# Patient Record
Sex: MALE | Race: WHITE | NOT HISPANIC OR LATINO | Employment: FULL TIME | URBAN - METROPOLITAN AREA
[De-identification: names, ages, dates, MRNs, and addresses within clinical notes are randomized per-mention and may not be internally consistent; named-entity substitution may affect disease eponyms.]

---

## 2017-10-27 ENCOUNTER — OFFICE VISIT (OUTPATIENT)
Dept: PEDIATRICS | Facility: CLINIC | Age: 27
End: 2017-10-27
Payer: COMMERCIAL

## 2017-10-27 VITALS
TEMPERATURE: 98.5 F | RESPIRATION RATE: 16 BRPM | HEART RATE: 76 BPM | DIASTOLIC BLOOD PRESSURE: 82 MMHG | HEIGHT: 69 IN | SYSTOLIC BLOOD PRESSURE: 128 MMHG | WEIGHT: 217.5 LBS | BODY MASS INDEX: 32.22 KG/M2

## 2017-10-27 DIAGNOSIS — R10.30 INGUINAL PAIN, UNSPECIFIED LATERALITY: Primary | ICD-10-CM

## 2017-10-27 DIAGNOSIS — N50.819 TESTICULAR DISCOMFORT: ICD-10-CM

## 2017-10-27 PROCEDURE — 99213 OFFICE O/P EST LOW 20 MIN: CPT | Performed by: INTERNAL MEDICINE

## 2017-10-27 NOTE — MR AVS SNAPSHOT
After Visit Summary   10/27/2017    Cristobal Rodriguez Jr.    MRN: [id number]           Patient Information     Date Of Birth          1990        Visit Information        Provider Department      10/27/2017 3:00 PM Raymundo Stahl MD [facility]        Today's Diagnoses     Inguinal pain, unspecified laterality    -  1    Testicular discomfort          Care Instructions      Testicular Pain, Unclear Cause  You have had pain in one or both testicles. Based on your exam today, the exact cause of your pain is not certain. But your condition does not appear to be dangerous. Testicles are very sensitive. Even a small injury can cause quite a bit of pain. Other possible causes of testicular pain include kidney stones, cysts, mumps, inflammatory conditions, chronic conditions, hernia, infection, and a twisted testicle.  Certain tests may be done to rule out an underlying problem causing the pain. Nothing conclusive was found today. Most likely, the pain will go away on its own. If it doesn t, you may need more tests.    Home care  Medicine may be prescribed to help relieve pain and swelling. This may be an over-the-counter pain reliever or prescription pain medication. Take all medicine as directed.  The following are general care guidelines:    To relieve pain and swelling, apply an ice pack wrapped in a thin towel for 10 minutes at a time. Continue this on and off for 1 to 2 days.    When lying down, place a small rolled towel under your scrotum. When moving around, wear a jockstrap (athletic supporter) or supportive underwear. These will help support and protect your testicles.    If it hurts to walk, walk as little as possible until you feel better.    Avoid strenuous activity until you feel better.    Do not have sex until you feel better.    If you have severe pain in the testicle, seek care right away. Delay may lead to permanent loss of the testicle s function.  Follow-up care  Follow  up with your healthcare provider, or as advised.  When to seek medical advice  Call your healthcare provider right away if any of these occur:    Fever of 100.4 F (38 C) or higher    Worsening of the pain or severe pain    Swelling of the testicle or scrotum    A lump in the scrotum    Warm and red scrotum (signs of infection)    Nausea and vomiting    Pain or swelling in abdomen    Trouble urinating    Numbness or weakness in the leg    Shrinking of the testicle    Blood in your urine  Date Last Reviewed: 10/1/2016    1402-9516 [employer]. [address] [place], [place] [address]. All rights reserved. This information is not intended as a substitute for professional medical care. Always follow your healthcare professional's instructions.                Follow-ups after your visit        Future tests that were ordered for you today     Open Future Orders        Priority Expected Expires Ordered    US Testicular and Scrotum Routine  10/27/2018 10/27/2017            Who to contact     If you have questions or need follow up information about today's clinic visit or your schedule please contact [facility] directly at [phone].  Normal or non-critical lab and imaging results will be communicated to you by DrEd Online Doctorhart, letter or phone within 4 business days after the clinic has received the results. If you do not hear from us within 7 days, please contact the clinic through JK-Groupt or phone. If you have a critical or abnormal lab result, we will notify you by phone as soon as possible.  Submit refill requests through Employma or call your pharmacy and they will forward the refill request to us. Please allow 3 business days for your refill to be completed.          Additional Information About Your Visit        Employma Information     Employ[employer] gives you secure access to your electronic health record. If you see a primary care provider, you can also send messages to your care team and make  "appointments. If you have questions, please call your primary care clinic.  If you do not have a primary care provider, please call [phone] and they will assist you.        Care EveryWhere ID     This is your Care EveryWhere ID. This could be used by other organizations to access your [place] medical records  [id number]        Your Vitals Were     Pulse Temperature Respirations Height BMI (Body Mass Index)       76 98.5  F (36.9  C) (Oral) 16 5' 8.75\" (1.746 m) 32.35 kg/m2        Blood Pressure from Last 3 Encounters:   10/27/17 128/82   07/29/16 120/70   09/11/14 124/76    Weight from Last 3 Encounters:   10/27/17 217 lb 8 oz (98.7 kg)   07/29/16 189 lb 11.2 oz (86 kg)   09/11/14 184 lb 14.4 oz (83.9 kg)                 Today's Medication Changes          These changes are accurate as of: 10/27/17  3:45 PM.  If you have any questions, ask your nurse or doctor.               Stop taking these medicines if you haven't already. Please contact your care team if you have questions.     diltiazem 2% in PLO cream (FV COMPOUNDED) 2% Gel   Stopped by:  Raymundo Stahl MD                    Primary Care Provider Office Phone # Fax #    Raymundo Stahl [phone] [address] [place] [address]        Equal Access to Services     [facility]: Hadii daquan vee hadasho Sodimas, waaxda luqadaha, qaybta kaalmaamanda oreilly. So [facility] [phone].    ATENCIÓN: Si habla español, tiene a bowman disposición servicios gratuitos de asistencia lingüística. Llame al [phone].    We comply with applicable federal civil rights laws and [place] laws. We do not discriminate on the basis of race, color, national origin, age, disability, sex, sexual orientation, or gender identity.            Thank you!     Thank you for choosing [facility]  for your care. Our goal is always to provide you with excellent care. Hearing back from our patients " is one way we can continue to improve our services. Please take a few minutes to complete the written survey that you may receive in the mail after your visit with us. Thank you!             Your Updated Medication List - Protect others around you: Learn how to safely use, store and throw away your medicines at www.disposemymeds.org.      Notice  As of 10/27/2017  3:45 PM    You have not been prescribed any medications.

## 2017-10-27 NOTE — NURSING NOTE
"Chief Complaint   Patient presents with     Groin Pain       Initial /82  Pulse 76  Temp 98.5  F (36.9  C) (Oral)  Resp 16  Ht 5' 8.75\" (1.746 m)  Wt 217 lb 8 oz (98.7 kg)  BMI 32.35 kg/m2 Estimated body mass index is 32.35 kg/(m^2) as calculated from the following:    Height as of this encounter: 5' 8.75\" (1.746 m).    Weight as of this encounter: 217 lb 8 oz (98.7 kg).  Medication Reconciliation: complete   Katelyn COCHRAN, CMA,AAMA      "

## 2017-10-27 NOTE — PROGRESS NOTES
"SUBJECTIVE:                                                    Cristobal Rodriguez Jr. is a 27 year old male who presents to clinic today for the following health issues:    Groin pan      Duration: 3-4 months    Description (location/character/radiation): Groin pain area    Intensity:  moderate    Accompanying signs and symptoms: abdominal pain. Pt not sure if its hernia related    History (similar episodes/previous evaluation):     Precipitating or alleviating factors: None    Therapies tried and outcome: None       patient here for above, notes some groin pain/discomfort. no injury. symptoms are not always there, seems to occur about every other week. nothing mkes it better. has been workign out more and can \"feel\" it there. hasn't felt a bulge per se. no history of hernias or any abdominal surgery in the past. no fevers, no other symtoms that he has . appetite is good. feels fine otherwise. patient usually doesn' wear underwear, but has bene wearing tighter athletic type underwear and this does seem to help. patient got a flu shot a few weeks ago. No other concerns or complaints today.     Problem list and histories reviewed & adjusted, as indicated.  Additional history: as documented  Patient Active Problem List    Diagnosis Date Noted     Anal fissure 06/06/2014     Priority: Medium     CARDIOVASCULAR SCREENING; LDL GOAL LESS THAN 160 12/06/2011     Priority: Medium     Social History   Substance Use Topics     Smoking status: Never Smoker     Smokeless tobacco: Never Used     Alcohol use Yes      Comment: once weekly     Family History   Problem Relation Age of Onset     Hypertension Father      Myocardial Infarction Paternal Grandfather      Hypertension Sister        ROS:  A complete 10 point review of systems was taken and negative except for those noted in the subjective/HPI section(s) above     BP Readings from Last 3 Encounters:   10/27/17 128/82   07/29/16 120/70   09/11/14 124/76    Wt " "Readings from Last 3 Encounters:   10/27/17 217 lb 8 oz (98.7 kg)   07/29/16 189 lb 11.2 oz (86 kg)   09/11/14 184 lb 14.4 oz (83.9 kg)                      OBJECTIVE:                                                    /82  Pulse 76  Temp 98.5  F (36.9  C) (Oral)  Resp 16  Ht 5' 8.75\" (1.746 m)  Wt 217 lb 8 oz (98.7 kg)  BMI 32.35 kg/m2   Constitutional: healthy, alert and no distress  HEENT: normocephalic and atrumatic, mucous membranes moist, op clear, mucous membranes moist, neck supple   Cardiovascular: regular rate and rhythm no rubs, gallops or murmurs normal S1/S2; no S3 or S4  Respiratory: clear to auscultation bilaterally in all lung fields, normal insp/exp effort. Good air movement  Gastrointestinal: Abdomen soft, non-tender. BS normal. No masses, organomegaly  :  penis, scrotum, testes normal, no hernias, epididymal tenderness present on the R, ? varicocele, no mass apprecited, hernia not appreciated  Back:  CVA Tenderness Present No   Musculoskeletal: extremities normal- no gross deformities noted, gait normal and normal muscle tone  Skin: no suspicious lesions or rashes  Psychiatric: mentation appears normal and affect normal/bright           ASSESSMENT/PLAN:                                                        ICD-10-CM    1. Inguinal pain, unspecified laterality R10.30 US Testicular and Scrotum   2. Testicular discomfort N50.819 US Testicular and Scrotum   discussed with patient (or patient's parents/caregiver) pathophysiology of condition and treatment options.  physical exam shows possible varicocele, given history will get scrotal US for further evaluation. reviwed signs/symtpsom fo hernia and other cuases of male  pain, Also d/w pt signs/symptoms of worsening of condition and need for urgent ED evaluation. Patient verbalized understanding and is agreeable to this plan.  Patient verbalized understanding and is agreeable to this plan.     Estimated body mass index is 28.01 kg/(m^2) " "as calculated from the following:    Height as of 6/6/14: 5' 9\" (1.753 m).    Weight as of 7/29/16: 189 lb 11.2 oz (86 kg).      Return to clinic as needed or if symptoms persist, change, worsen or if any new symptoms develop.      Raymundo Stahl M.D.  Internal Medicine-Pediatrics            "

## 2017-10-27 NOTE — PATIENT INSTRUCTIONS
Testicular Pain, Unclear Cause  You have had pain in one or both testicles. Based on your exam today, the exact cause of your pain is not certain. But your condition does not appear to be dangerous. Testicles are very sensitive. Even a small injury can cause quite a bit of pain. Other possible causes of testicular pain include kidney stones, cysts, mumps, inflammatory conditions, chronic conditions, hernia, infection, and a twisted testicle.  Certain tests may be done to rule out an underlying problem causing the pain. Nothing conclusive was found today. Most likely, the pain will go away on its own. If it doesn t, you may need more tests.    Home care  Medicine may be prescribed to help relieve pain and swelling. This may be an over-the-counter pain reliever or prescription pain medication. Take all medicine as directed.  The following are general care guidelines:    To relieve pain and swelling, apply an ice pack wrapped in a thin towel for 10 minutes at a time. Continue this on and off for 1 to 2 days.    When lying down, place a small rolled towel under your scrotum. When moving around, wear a jockstrap (athletic supporter) or supportive underwear. These will help support and protect your testicles.    If it hurts to walk, walk as little as possible until you feel better.    Avoid strenuous activity until you feel better.    Do not have sex until you feel better.    If you have severe pain in the testicle, seek care right away. Delay may lead to permanent loss of the testicle s function.  Follow-up care  Follow up with your healthcare provider, or as advised.  When to seek medical advice  Call your healthcare provider right away if any of these occur:    Fever of 100.4 F (38 C) or higher    Worsening of the pain or severe pain    Swelling of the testicle or scrotum    A lump in the scrotum    Warm and red scrotum (signs of infection)    Nausea and vomiting    Pain or swelling in abdomen    Trouble  urinating    Numbness or weakness in the leg    Shrinking of the testicle    Blood in your urine  Date Last Reviewed: 10/1/2016    2470-4380 [employer]. [address] [place] [address], [place] [address]. All rights reserved. This information is not intended as a substitute for professional medical care. Always follow your healthcare professional's instructions.

## 2017-11-06 ENCOUNTER — HOSPITAL ENCOUNTER (OUTPATIENT)
Dept: ULTRASOUND IMAGING | Facility: CLINIC | Age: 27
Discharge: HOME | End: 2017-11-06
Attending: INTERNAL MEDICINE | Admitting: INTERNAL MEDICINE
Payer: COMMERCIAL

## 2017-11-06 DIAGNOSIS — R10.30 INGUINAL PAIN, UNSPECIFIED LATERALITY: ICD-10-CM

## 2017-11-06 DIAGNOSIS — N50.819 TESTICULAR DISCOMFORT: ICD-10-CM

## 2017-11-06 PROCEDURE — 93976 VASCULAR STUDY: CPT

## 2018-10-15 ENCOUNTER — OFFICE VISIT (OUTPATIENT)
Dept: PEDIATRICS | Facility: CLINIC | Age: 28
End: 2018-10-15
Payer: COMMERCIAL

## 2018-10-15 VITALS
SYSTOLIC BLOOD PRESSURE: 122 MMHG | OXYGEN SATURATION: 96 % | DIASTOLIC BLOOD PRESSURE: 80 MMHG | TEMPERATURE: 97.8 F | WEIGHT: 207 LBS | BODY MASS INDEX: 30.66 KG/M2 | HEART RATE: 75 BPM | HEIGHT: 69 IN

## 2018-10-15 DIAGNOSIS — R03.0 ELEVATED BP WITHOUT DIAGNOSIS OF HYPERTENSION: Primary | ICD-10-CM

## 2018-10-15 DIAGNOSIS — Z23 NEED FOR PROPHYLACTIC VACCINATION AND INOCULATION AGAINST INFLUENZA: ICD-10-CM

## 2018-10-15 PROCEDURE — 90686 IIV4 VACC NO PRSV 0.5 ML IM: CPT | Performed by: INTERNAL MEDICINE

## 2018-10-15 PROCEDURE — 99213 OFFICE O/P EST LOW 20 MIN: CPT | Mod: 25 | Performed by: INTERNAL MEDICINE

## 2018-10-15 PROCEDURE — 90471 IMMUNIZATION ADMIN: CPT | Performed by: INTERNAL MEDICINE

## 2018-10-15 RX ORDER — MULTIPLE VITAMINS W/ MINERALS TAB 9MG-400MCG
1 TAB ORAL DAILY
COMMUNITY

## 2018-10-15 NOTE — MR AVS SNAPSHOT
After Visit Summary   10/15/2018    Cristobal Rodriguez Jr.    MRN: [id number]           Patient Information     Date Of Birth          1990        Visit Information        Provider Department      10/15/2018 7:30 AM Michele Laird MD [facility]        Today's Diagnoses     Elevated BP without diagnosis of hypertension    -  1    Need for prophylactic vaccination and inoculation against influenza          Care Instructions    Nice to meet you today!    Blood pressure looks great today - my guess is that you were either a bit nervous for the screening or their BP cuff was off.     You are free to stop by our Pharmacy anytime to have your BP checked - they will let us know if it is high and we will be in touch.     Come back to see Dr. Stahl or myself for a Wellness/Physical in the next month or two and we'll do a head to toe exam.     Bring a copy of the bloodwork from your check at work.           Follow-ups after your visit        Follow-up notes from your care team     Return in about 4 weeks (around 11/12/2018) for Wellness Visit.      Your next 10 appointments already scheduled     Nov 02, 2018  7:10 AM CDT   PHYSICAL with Michele Laird MD   [facility]    [address] [place] [id number] [phone]              Who to contact     If you have questions or need follow up information about today's clinic visit or your schedule please contact [facility] directly at [phone].  Normal or non-critical lab and imaging results will be communicated to you by MyChart, letter or phone within 4 business days after the clinic has received the results. If you do not hear from us within 7 days, please contact the clinic through MyChart or phone. If you have a critical or abnormal lab result, we will notify you by phone as soon as possible.  Submit refill requests through [employer] Mediahart or call your  "pharmacy and they will forward the refill request to us. Please allow 3 business days for your refill to be completed.          Additional Information About Your Visit        Putneyhart Information     [employer] gives you secure access to your electronic health record. If you see a primary care provider, you can also send messages to your care team and make appointments. If you have questions, please call your primary care clinic.  If you do not have a primary care provider, please call [phone] and they will assist you.        Care EveryWhere ID     This is your Care EveryWhere ID. This could be used by other organizations to access your [facility] medical records  [id number]        Your Vitals Were     Pulse Temperature Height Pulse Oximetry BMI (Body Mass Index)       75 97.8  F (36.6  C) (Oral) 5' 8.75\" (1.746 m) 96% 30.79 kg/m2        Blood Pressure from Last 3 Encounters:   10/15/18 122/80   10/27/17 128/82   07/29/16 120/70    Weight from Last 3 Encounters:   10/15/18 207 lb (93.9 kg)   10/27/17 217 lb 8 oz (98.7 kg)   07/29/16 189 lb 11.2 oz (86 kg)              We Performed the Following     FLU VACCINE, SPLIT VIRUS, IM (QUADRIVALENT) [42132]- >3 YRS     Vaccine Administration, Initial [00087]        Primary Care Provider Office Phone # Fax #    Raymundo Stahl [phone] [address] [place] [address]        Equal Access to Services     [facility]: Hadii aad ku hadasho Soomaali, waaxda luqadaha, qaybta kaalmada adeegyada, amanda sewell . So [facility] [phone].    ATENCIÓN: Si habla español, tiene a bowman disposición servicios gratuitos de asistencia lingüística. Llame al [phone].    We comply with applicable federal civil rights laws and [place] laws. We do not discriminate on the basis of race, color, national origin, age, disability, sex, sexual orientation, or gender identity.            Thank you!     Thank you for choosing " [facility]  for your care. Our goal is always to provide you with excellent care. Hearing back from our patients is one way we can continue to improve our services. Please take a few minutes to complete the written survey that you may receive in the mail after your visit with us. Thank you!             Your Updated Medication List - Protect others around you: Learn how to safely use, store and throw away your medicines at www.disposemymeds.org.          This list is accurate as of 10/15/18  8:12 AM.  Always use your most recent med list.                   Brand Name Dispense Instructions for use Diagnosis    Multi-vitamin Tabs tablet      Take 1 tablet by mouth daily

## 2018-10-15 NOTE — PROGRESS NOTES
"  SUBJECTIVE:   Cristobal Rodriguez Jr. is a 28 year old male who presents to clinic today for the following health issues:    Patient had a wellness visit at work a few weeks back and they flagged him on blood pressure and to him he should get evaluated by it. Patient has not been checking blood pressures. Reports no chest pain.   -------------    Sister with htn at age 20. Otherwise fh in older relatives.     Reviewed BPs have been normal in clinic the past few years.     Has some intermittent nausea when working out but otherwise no cp or sob. Trying to \"hit it hard\" and may not eat enough before. No leg swelling, urinary symptoms.     Problem list and histories reviewed & adjusted, as indicated.  Additional history: as documented    Patient Active Problem List   Diagnosis     CARDIOVASCULAR SCREENING; LDL GOAL LESS THAN 160     Anal fissure     Past Surgical History:   Procedure Laterality Date     ARTHROTOMY SHOULDER, ROTATOR CUFF REPAIR, COMBINED  4/26/2013    Procedure: COMBINED ARTHROTOMY SHOULDER, ROTATOR CUFF REPAIR;  Left Open Shoulder Capsular Shift and Labral Repair;  Surgeon: Ras Caraballo MD;  Location: [facility] OR     FRACTURE TX, FINGER/HAND      No surgery just casting.     LASIK      bilateral       Social History   Substance Use Topics     Smoking status: Never Smoker     Smokeless tobacco: Never Used     Alcohol use Yes      Comment: once weekly     Family History   Problem Relation Age of Onset     Hypertension Father      Depression Mother      Myocardial Infarction Paternal Grandfather      In his 50s     Hypertension Sister      In her mid-20s     Depression Maternal Grandmother      Depression Sister      Depression Brother      Colon Cancer No family hx of      Prostate Cancer No family hx of          Current Outpatient Prescriptions   Medication Sig Dispense Refill     multivitamin, therapeutic with minerals (MULTI-VITAMIN) TABS tablet Take 1 tablet by mouth daily       Allergies   Allergen " "Reactions     Amoxicillin Rash       Reviewed and updated as needed this visit by clinical staff       Reviewed and updated as needed this visit by Provider         ROS:  Constitutional, HEENT, cardiovascular, pulmonary, gi and gu systems are negative, except as otherwise noted.    OBJECTIVE:     /80  Pulse 75  Temp 97.8  F (36.6  C) (Oral)  Ht 5' 8.75\" (1.746 m)  Wt 207 lb (93.9 kg)  SpO2 96%  BMI 30.79 kg/m2  Body mass index is 30.79 kg/(m^2).  GENERAL: healthy, alert and no distress  RESP: lungs clear to auscultation - no rales, rhonchi or wheezes  CV: regular rate and rhythm, normal S1 S2, no S3 or S4, no murmur, click or rub, no peripheral edema and peripheral pulses strong  MS: no gross musculoskeletal defects noted, no edema  SKIN: no suspicious lesions or rashes    Diagnostic Test Results:  none     ASSESSMENT/PLAN:       ICD-10-CM    1. Elevated BP without diagnosis of hypertension R03.0    2. Need for prophylactic vaccination and inoculation against influenza Z23 Vaccine Administration, Initial [06496]     FLU VACCINE, SPLIT VIRUS, IM (QUADRIVALENT) [21379]- >3 YRS     Patient Instructions   Nice to meet you today!    Blood pressure looks great today - my guess is that you were either a bit nervous for the screening or their BP cuff was off.     You are free to stop by our Pharmacy anytime to have your BP checked - they will let us know if it is high and we will be in touch.     Come back to see Dr. Stahl or myself for a Wellness/Physical in the next month or two and we'll do a head to toe exam.     Bring a copy of the bloodwork from your check at work.     Michele Laird MD  [facility]    Injectable Influenza Immunization Documentation    1.  Is the person to be vaccinated sick today?   No    2. Does the person to be vaccinated have an allergy to a component   of the vaccine?   No  Egg Allergy Algorithm Link    3. Has the person to be vaccinated ever had a serious " reaction   to influenza vaccine in the past?   No    4. Has the person to be vaccinated ever had Guillain-Barré syndrome?   No    Form completed by Flakita Eli CMA

## 2018-10-15 NOTE — PATIENT INSTRUCTIONS
Nice to meet you today!    Blood pressure looks great today - my guess is that you were either a bit nervous for the screening or their BP cuff was off.     You are free to stop by our Pharmacy anytime to have your BP checked - they will let us know if it is high and we will be in touch.     Come back to see Dr. Stahl or myself for a Wellness/Physical in the next month or two and we'll do a head to toe exam.     Bring a copy of the bloodwork from your check at work.

## 2018-11-02 ENCOUNTER — OFFICE VISIT (OUTPATIENT)
Dept: PEDIATRICS | Facility: CLINIC | Age: 28
End: 2018-11-02
Payer: COMMERCIAL

## 2018-11-02 VITALS
TEMPERATURE: 98.3 F | OXYGEN SATURATION: 96 % | DIASTOLIC BLOOD PRESSURE: 72 MMHG | HEART RATE: 76 BPM | HEIGHT: 69 IN | SYSTOLIC BLOOD PRESSURE: 118 MMHG | WEIGHT: 207 LBS | BODY MASS INDEX: 30.66 KG/M2

## 2018-11-02 DIAGNOSIS — E66.09 CLASS 1 OBESITY DUE TO EXCESS CALORIES WITHOUT SERIOUS COMORBIDITY WITH BODY MASS INDEX (BMI) OF 30.0 TO 30.9 IN ADULT: ICD-10-CM

## 2018-11-02 DIAGNOSIS — E66.811 CLASS 1 OBESITY DUE TO EXCESS CALORIES WITHOUT SERIOUS COMORBIDITY WITH BODY MASS INDEX (BMI) OF 30.0 TO 30.9 IN ADULT: ICD-10-CM

## 2018-11-02 DIAGNOSIS — Z00.00 ROUTINE GENERAL MEDICAL EXAMINATION AT A HEALTH CARE FACILITY: Primary | ICD-10-CM

## 2018-11-02 PROCEDURE — 99395 PREV VISIT EST AGE 18-39: CPT | Performed by: INTERNAL MEDICINE

## 2018-11-02 ASSESSMENT — ENCOUNTER SYMPTOMS
SORE THROAT: 0
ARTHRALGIAS: 0
HEMATURIA: 0
CHILLS: 0
NERVOUS/ANXIOUS: 0
DIARRHEA: 0
PALPITATIONS: 0
HEADACHES: 0
FEVER: 0
EYE PAIN: 0
JOINT SWELLING: 0
ABDOMINAL PAIN: 0
PARESTHESIAS: 0
COUGH: 0
HEMATOCHEZIA: 0
NAUSEA: 0
SHORTNESS OF BREATH: 0
DIZZINESS: 0
DYSURIA: 0
MYALGIAS: 0
WEAKNESS: 0
HEARTBURN: 0
FREQUENCY: 0
CONSTIPATION: 0

## 2018-11-02 NOTE — PATIENT INSTRUCTIONS
Nice to see you again today!    Keep working on the working out/diet.     Preventive Health Recommendations  Male Ages 26 - 39    Yearly exam:             See your health care provider every year in order to  o   Review health changes.   o   Discuss preventive care.    o   Review your medicines if your doctor has prescribed any.    You should be tested each year for STDs (sexually transmitted diseases), if you re at risk.     After age 35, talk to your provider about cholesterol testing. If you are at risk for heart disease, have your cholesterol tested at least every 5 years.     If you are at risk for diabetes, you should have a diabetes test (fasting glucose).  Shots: Get a flu shot each year. Get a tetanus shot every 10 years.     Nutrition:    Eat at least 5 servings of fruits and vegetables daily.     Eat whole-grain bread, whole-wheat pasta and brown rice instead of white grains and rice.     Get adequate Calcium and Vitamin D.     Lifestyle    Exercise for at least 150 minutes a week (30 minutes a day, 5 days a week). This will help you control your weight and prevent disease.     Limit alcohol to one drink per day.     No smoking.     Wear sunscreen to prevent skin cancer.     See your dentist every six months for an exam and cleaning.

## 2018-11-02 NOTE — PROGRESS NOTES
SUBJECTIVE:   CC: Cristobal Rodriguez Jr. is an 28 year old male who presents for preventative health visit.     Physical   Annual:     Getting at least 3 servings of Calcium per day:  Yes    Bi-annual eye exam:  Yes    Dental care twice a year:  NO    Sleep apnea or symptoms of sleep apnea:  None    Diet:  Regular (no restrictions)    Frequency of exercise:  2-3 days/week    Duration of exercise:  30-45 minutes    Taking medications regularly:  Not Applicable    Additional concerns today:  No    CONCERN: None    Today's PHQ-2 Score:   PHQ-2 ( 1999 [employer]) 11/2/2018   Q1: Little interest or pleasure in doing things 0   Q2: Feeling down, depressed or hopeless 0   PHQ-2 Score 0   Q1: Little interest or pleasure in doing things Not at all   Q2: Feeling down, depressed or hopeless Not at all   PHQ-2 Score 0     Abuse: Current or Past(Physical, Sexual or Emotional)- No  Do you feel safe in your environment - Yes    Social History   Substance Use Topics     Smoking status: Never Smoker     Smokeless tobacco: Never Used     Alcohol use Yes      Comment: once weekly     Alcohol Use 11/2/2018   If you drink alcohol do you typically have greater than 3 drinks per day OR greater than 7 drinks per week? No   No flowsheet data found.    Last PSA: No results found for: PSA    Reviewed orders with patient. Reviewed health maintenance and updated orders accordingly - Yes  Patient Active Problem List   Diagnosis     CARDIOVASCULAR SCREENING; LDL GOAL LESS THAN 160     Anal fissure     Past Surgical History:   Procedure Laterality Date     ARTHROTOMY SHOULDER, ROTATOR CUFF REPAIR, COMBINED  4/26/2013    Procedure: COMBINED ARTHROTOMY SHOULDER, ROTATOR CUFF REPAIR;  Left Open Shoulder Capsular Shift and Labral Repair;  Surgeon: Ras Caraballo MD;  Location: [facility] OR     FRACTURE TX, FINGER/HAND      No surgery just casting.     LASIK      bilateral       Social History   Substance Use Topics     Smoking status: Never Smoker     Smokeless  tobacco: Never Used     Alcohol use Yes      Comment: once weekly     Family History   Problem Relation Age of Onset     Hypertension Father      Depression Mother      Myocardial Infarction Paternal Grandfather      In his 50s     Hypertension Sister      In her mid-20s     Depression Maternal Grandmother      Depression Sister      Depression Brother      Colon Cancer No family hx of      Prostate Cancer No family hx of          Current Outpatient Prescriptions   Medication Sig Dispense Refill     multivitamin, therapeutic with minerals (MULTI-VITAMIN) TABS tablet Take 1 tablet by mouth daily       Allergies   Allergen Reactions     Amoxicillin Rash       Reviewed and updated as needed this visit by clinical staff  Tobacco  Allergies  Meds  Surg Hx         Reviewed and updated as needed this visit by Provider  Surg Hx        Past Medical History:   Diagnosis Date     NO ACTIVE PROBLEMS       Past Surgical History:   Procedure Laterality Date     ARTHROTOMY SHOULDER, ROTATOR CUFF REPAIR, COMBINED  4/26/2013    Procedure: COMBINED ARTHROTOMY SHOULDER, ROTATOR CUFF REPAIR;  Left Open Shoulder Capsular Shift and Labral Repair;  Surgeon: Ras Caraballo MD;  Location: [facility] OR     FRACTURE TX, FINGER/HAND      No surgery just casting.     LASIK      bilateral       Review of Systems   Constitutional: Negative for chills and fever.   HENT: Negative for congestion, ear pain, hearing loss and sore throat.    Eyes: Negative for pain and visual disturbance.   Respiratory: Negative for cough and shortness of breath.    Cardiovascular: Negative for chest pain, palpitations and peripheral edema.   Gastrointestinal: Negative for abdominal pain, constipation, diarrhea, heartburn, hematochezia and nausea.   Genitourinary: Negative for discharge, dysuria, frequency, genital sores, hematuria, impotence and urgency.   Musculoskeletal: Negative for arthralgias, joint swelling and myalgias.   Skin: Negative for rash.  "  Neurological: Negative for dizziness, weakness, headaches and paresthesias.   Psychiatric/Behavioral: Negative for mood changes. The patient is not nervous/anxious.      OBJECTIVE:   /72  Pulse 76  Temp 98.3  F (36.8  C) (Oral)  Ht 5' 8.75\" (1.746 m)  Wt 207 lb (93.9 kg)  SpO2 96%  BMI 30.79 kg/m2    Physical Exam  GENERAL: healthy, alert and no distress  EYES: Eyes grossly normal to inspection, PERRL and conjunctivae and sclerae normal  HENT: ear canals and TM's normal, nose and mouth without ulcers or lesions  NECK: no adenopathy, no asymmetry, masses, or scars and thyroid normal to palpation  RESP: lungs clear to auscultation - no rales, rhonchi or wheezes  CV: regular rate and rhythm, normal S1 S2, no S3 or S4, no murmur, click or rub, no peripheral edema and peripheral pulses strong  ABDOMEN: soft, nontender, no hepatosplenomegaly, no masses and bowel sounds normal  MS: no gross musculoskeletal defects noted, no edema  SKIN: no suspicious lesions or rashes  NEURO: Normal strength and tone, mentation intact and speech normal  PSYCH: mentation appears normal, affect normal/bright    Diagnostic Test Results:  none     ASSESSMENT/PLAN:       ICD-10-CM    1. Routine general medical examination at a health care facility Z00.00    2. Class 1 obesity due to excess calories without serious comorbidity with body mass index (BMI) of 30.0 to 30.9 in adult E66.09     Z68.30      Overall doing well.     Muscular but does carry some weight in abdomen - working on diet/exercise. He would like to lose 10-20 lbs.     F/u in 1 year or sooner if needed.     COUNSELING:   Reviewed preventive health counseling, as reflected in patient instructions       Regular exercise       Healthy diet/nutrition       Family planning       Declines STI screening today    BP Readings from Last 1 Encounters:   11/02/18 118/72     Estimated body mass index is 30.79 kg/(m^2) as calculated from the following:    Height as of this " "encounter: 5' 8.75\" (1.746 m).    Weight as of this encounter: 207 lb (93.9 kg).    Weight management plan: Discussed healthy diet and exercise guidelines and patient will follow up in 12 months in clinic to re-evaluate.     reports that he has never smoked. He has never used smokeless tobacco.    Counseling Resources:  ATP IV Guidelines  Pooled Cohorts Equation Calculator  FRAX Risk Assessment  ICSI Preventive Guidelines  Dietary Guidelines for Americans, 2010  USDA's MyPlate  ASA Prophylaxis  Lung CA Screening    Michele Laird MD  [facility]  "

## 2018-11-02 NOTE — MR AVS SNAPSHOT
After Visit Summary   11/2/2018    Cristobal Rodriguez Jr.    MRN: [id number]           Patient Information     Date Of Birth          1990        Visit Information        Provider Department      11/2/2018 7:10 AM Michele Laird MD [facility]        Today's Diagnoses     Routine general medical examination at a health care facility    -  1      Care Instructions    Nice to see you again today!    Keep working on the working out/diet.     Preventive Health Recommendations  Male Ages 26 - 39    Yearly exam:             See your health care provider every year in order to  o   Review health changes.   o   Discuss preventive care.    o   Review your medicines if your doctor has prescribed any.    You should be tested each year for STDs (sexually transmitted diseases), if you re at risk.     After age 35, talk to your provider about cholesterol testing. If you are at risk for heart disease, have your cholesterol tested at least every 5 years.     If you are at risk for diabetes, you should have a diabetes test (fasting glucose).  Shots: Get a flu shot each year. Get a tetanus shot every 10 years.     Nutrition:    Eat at least 5 servings of fruits and vegetables daily.     Eat whole-grain bread, whole-wheat pasta and brown rice instead of white grains and rice.     Get adequate Calcium and Vitamin D.     Lifestyle    Exercise for at least 150 minutes a week (30 minutes a day, 5 days a week). This will help you control your weight and prevent disease.     Limit alcohol to one drink per day.     No smoking.     Wear sunscreen to prevent skin cancer.     See your dentist every six months for an exam and cleaning.             Follow-ups after your visit        Follow-up notes from your care team     Return in about 1 year (around 11/2/2019) for Wellness Visit.      Who to contact     If you have questions or need follow up information about today's clinic visit or your schedule please  "contact [facility] directly at [phone].  Normal or non-critical lab and imaging results will be communicated to you by MyChart, letter or phone within 4 business days after the clinic has received the results. If you do not hear from us within 7 days, please contact the clinic through MyChart or phone. If you have a critical or abnormal lab result, we will notify you by phone as soon as possible.  Submit refill requests through [employer] or call your pharmacy and they will forward the refill request to us. Please allow 3 business days for your refill to be completed.          Additional Information About Your Visit        [email or web address] Information     4[employer] gives you secure access to your electronic health record. If you see a primary care provider, you can also send messages to your care team and make appointments. If you have questions, please call your primary care clinic.  If you do not have a primary care provider, please call [phone] and they will assist you.        Care EveryWhere ID     This is your Care EveryWhere ID. This could be used by other organizations to access your [place] medical records  [id number]        Your Vitals Were     Pulse Temperature Height Pulse Oximetry BMI (Body Mass Index)       76 98.3  F (36.8  C) (Oral) 5' 8.75\" (1.746 m) 96% 30.79 kg/m2        Blood Pressure from Last 3 Encounters:   11/02/18 118/72   10/15/18 122/80   10/27/17 128/82    Weight from Last 3 Encounters:   11/02/18 207 lb (93.9 kg)   10/15/18 207 lb (93.9 kg)   10/27/17 217 lb 8 oz (98.7 kg)              Today, you had the following     No orders found for display       Primary Care Provider Office Phone # Fax #    Raymundo Stahl [phone]       [address] [place] [address]        Equal Access to Services     ALESSIO BOGGS AH: Hadii daquan munozo Sodimas, waaxda luqadaha, qaybta kaalmada adeegyada, amanda lao. So [facility] " [phone].    ATENCIÓN: Si gilmarla jim, tiene a bowman disposición servicios gratuitos de asistencia lingüística. Devante al [phone].    We comply with applicable federal civil rights laws and [place] laws. We do not discriminate on the basis of race, color, national origin, age, disability, sex, sexual orientation, or gender identity.            Thank you!     Thank you for choosing [facility]  for your care. Our goal is always to provide you with excellent care. Hearing back from our patients is one way we can continue to improve our services. Please take a few minutes to complete the written survey that you may receive in the mail after your visit with us. Thank you!             Your Updated Medication List - Protect others around you: Learn how to safely use, store and throw away your medicines at www.disposemymeds.org.          This list is accurate as of 11/2/18  7:24 AM.  Always use your most recent med list.                   Brand Name Dispense Instructions for use Diagnosis    Multi-vitamin Tabs tablet      Take 1 tablet by mouth daily

## 2018-11-06 PROBLEM — E66.09 CLASS 1 OBESITY DUE TO EXCESS CALORIES WITHOUT SERIOUS COMORBIDITY WITH BODY MASS INDEX (BMI) OF 30.0 TO 30.9 IN ADULT: Status: ACTIVE | Noted: 2018-11-06

## 2018-11-06 PROBLEM — E66.09 CLASS 1 OBESITY DUE TO EXCESS CALORIES WITHOUT SERIOUS COMORBIDITY WITH BODY MASS INDEX (BMI) OF 30.0 TO 30.9 IN ADULT: Status: ACTIVE | Noted: 2018-11-02

## 2018-11-06 PROBLEM — E66.811 CLASS 1 OBESITY DUE TO EXCESS CALORIES WITHOUT SERIOUS COMORBIDITY WITH BODY MASS INDEX (BMI) OF 30.0 TO 30.9 IN ADULT: Status: ACTIVE | Noted: 2018-11-06

## 2018-11-06 PROBLEM — E66.811 CLASS 1 OBESITY DUE TO EXCESS CALORIES WITHOUT SERIOUS COMORBIDITY WITH BODY MASS INDEX (BMI) OF 30.0 TO 30.9 IN ADULT: Status: ACTIVE | Noted: 2018-11-02

## 2019-02-01 ENCOUNTER — OFFICE VISIT (OUTPATIENT)
Dept: PEDIATRICS | Facility: CLINIC | Age: 29
End: 2019-02-01
Payer: COMMERCIAL

## 2019-02-01 VITALS
BODY MASS INDEX: 29.27 KG/M2 | RESPIRATION RATE: 16 BRPM | OXYGEN SATURATION: 96 % | HEART RATE: 78 BPM | SYSTOLIC BLOOD PRESSURE: 128 MMHG | TEMPERATURE: 98 F | WEIGHT: 197.6 LBS | HEIGHT: 69 IN | DIASTOLIC BLOOD PRESSURE: 86 MMHG

## 2019-02-01 DIAGNOSIS — R19.7 VOMITING AND DIARRHEA: Primary | ICD-10-CM

## 2019-02-01 DIAGNOSIS — R11.10 VOMITING AND DIARRHEA: Primary | ICD-10-CM

## 2019-02-01 LAB — LIPASE SERPL-CCNC: 68 U/L (ref 73–393)

## 2019-02-01 PROCEDURE — 83516 IMMUNOASSAY NONANTIBODY: CPT | Mod: 91 | Performed by: INTERNAL MEDICINE

## 2019-02-01 PROCEDURE — 83690 ASSAY OF LIPASE: CPT | Performed by: INTERNAL MEDICINE

## 2019-02-01 PROCEDURE — 83516 IMMUNOASSAY NONANTIBODY: CPT | Performed by: INTERNAL MEDICINE

## 2019-02-01 PROCEDURE — 99214 OFFICE O/P EST MOD 30 MIN: CPT | Performed by: INTERNAL MEDICINE

## 2019-02-01 PROCEDURE — 80053 COMPREHEN METABOLIC PANEL: CPT | Performed by: INTERNAL MEDICINE

## 2019-02-01 PROCEDURE — 36415 COLL VENOUS BLD VENIPUNCTURE: CPT | Performed by: INTERNAL MEDICINE

## 2019-02-01 ASSESSMENT — MIFFLIN-ST. JEOR: SCORE: 1851.69

## 2019-02-01 NOTE — PROGRESS NOTES
SUBJECTIVE:   Cristobal Rodriguez Jr. is a 28 year old male who presents to clinic today for the following health issues:    In the past 2-3 months, patient has been sick 4 to 5 times either vomiting, diarrhea, migraines, and aches or any combination of these symptoms. Has now had diarrhea for three days straight which is not getting better.     Diarrhea  Onset: Intermittent for 2-3 Months. Constant for the last three days     Description:   Consistency of stool: watery, runny, yellow and mucousy  Blood in stool: no   Number of loose stools in past 24 hours: 10-15    Progression of Symptoms:  same and constant    Accompanying Signs & Symptoms:  Fever: no   Nausea or vomiting; YES- Nausea Three Days   Abdominal pain: YES- Thinks it may be related to working out. Left sided abdominal pain.   Episodes of constipation: no   Weight loss: YES- about 10 lbs lighter     History:   Ill contacts: Possibly from work    Recent use of antibiotics: no    Recent travels: no          Recent medication-new or changes(Rx or OTC): no     Precipitating factors:   Pt has been trying huel 2-3 times a week for lunch. Also been doing pre work out before gym.     Alleviating factors:   See below    Therapies Tried and outcome:  Pepto bismol has been tried and has not helped. Has changed to a bland diet which hasn't helped either.     Has a 2 yo son in .    Over the last 2-3 months, has been getting a lot of stomach flu-like illness about once per month, twice; then twice in last 2 weeks.  No vomiting.    Usually has gotten soreness, shakes, and diarrhea.  2 days ago had a bad migraine followed by bad (worse) diarrhea.  Had some nausea and some diarrhea as well.      His courses have lasted about 24 hours or so.  Sometimes gets fevers.  No rashes.  No bloody diarrhea.     The last 2 times, his son did not have similar symptoms.        Problem list and histories reviewed & adjusted, as indicated.  Additional history: as  documented    Patient Active Problem List   Diagnosis     CARDIOVASCULAR SCREENING; LDL GOAL LESS THAN 160     Class 1 obesity due to excess calories without serious comorbidity with body mass index (BMI) of 30.0 to 30.9 in adult     Past Surgical History:   Procedure Laterality Date     ARTHROTOMY SHOULDER, ROTATOR CUFF REPAIR, COMBINED  4/26/2013    Procedure: COMBINED ARTHROTOMY SHOULDER, ROTATOR CUFF REPAIR;  Left Open Shoulder Capsular Shift and Labral Repair;  Surgeon: Ras Caraballo MD;  Location: [facility] OR     FRACTURE TX, FINGER/HAND      No surgery just casting.     LASIK      bilateral       Social History     Tobacco Use     Smoking status: Never Smoker     Smokeless tobacco: Never Used   Substance Use Topics     Alcohol use: Yes     Comment: once weekly     Family History   Problem Relation Age of Onset     Hypertension Father      Depression Mother      Myocardial Infarction Paternal Grandfather         In his 50s     Hypertension Sister         In her mid-20s     Depression Maternal Grandmother      Depression Sister      Depression Brother      Colon Cancer No family hx of      Prostate Cancer No family hx of          Current Outpatient Medications   Medication Sig Dispense Refill     multivitamin, therapeutic with minerals (MULTI-VITAMIN) TABS tablet Take 1 tablet by mouth daily       Allergies   Allergen Reactions     Amoxicillin Rash     BP Readings from Last 3 Encounters:   02/01/19 128/86   11/02/18 118/72   10/15/18 122/80    Wt Readings from Last 3 Encounters:   02/01/19 89.6 kg (197 lb 9.6 oz)   11/02/18 93.9 kg (207 lb)   10/15/18 93.9 kg (207 lb)                  Labs reviewed in EPIC    Reviewed and updated as needed this visit by clinical staff       Reviewed and updated as needed this visit by Provider         ROS:  CONSTITUTIONAL: NEGATIVE for fever, chills, change in weight  ENT/MOUTH: NEGATIVE for ear, mouth and throat problems  RESP: NEGATIVE for significant cough or SOB  CV: NEGATIVE  "for chest pain, palpitations or peripheral edema    OBJECTIVE:                                                    /86 (BP Location: Right arm, Patient Position: Chair, Cuff Size: Adult Regular)   Pulse 78   Temp 98  F (36.7  C) (Oral)   Resp 16   Ht 1.753 m (5' 9\")   Wt 89.6 kg (197 lb 9.6 oz)   SpO2 96%   BMI 29.18 kg/m    Body mass index is 29.18 kg/m .   GENERAL: healthy, alert, well nourished, well hydrated, no distress  HENT: ear canals- normal; TMs- normal; Nose- normal; Mouth- no ulcers, no lesions  NECK: no tenderness, no adenopathy, no asymmetry, no masses, no stiffness; thyroid- normal to palpation  RESP: lungs clear to auscultation - no rales, no rhonchi, no wheezes  CV: regular rates and rhythm, normal S1 S2, no S3 or S4 and no murmur, no click or rub -  ABDOMEN: soft, no tenderness, no  hepatosplenomegaly, no masses, normal bowel sounds    Diagnostic test results:  Diagnostic Test Results:  none      ASSESSMENT/PLAN:                                                    1. Vomiting and diarrhea  May very well be from his supplements, but also has been exposed several times to his son's illnesses in .  Suggested he limit his supplements, and use bland diet.   Labs today to ensure on pancreas issues or biliary issues; will collect stool sample for testing as well. If within normal limits, would suggest he stop his supplements, or else try some bentyl for possible irritable bowel syndrome.   - Enteric Bacteria and Virus Panel by [facility]; Future  - Comprehensive metabolic panel  - Lipase  - Tissue transglutaminase marychuy IgA and IgG      See Patient Instructions    Cristobal Schrader MD  [facility]    "

## 2019-02-01 NOTE — PATIENT INSTRUCTIONS
"[place] foods; minimize spicey foods and acidic foods.    Lab work downstairs today.  Directions:  As you walk through the first floor, you'll see (on the right) first the pharmacy, then some bathrooms, then the \"Lab and Imaging\" area. Give them your name at the window there and wait for them to call you.      a stool kit and return it if you have diarrhea that recurs.    Cristobal Schrader MD  Internal Medicine and Pediatrics     "

## 2019-02-02 LAB
ALBUMIN SERPL-MCNC: 4.5 G/DL (ref 3.4–5)
ALP SERPL-CCNC: 76 U/L (ref 40–150)
ALT SERPL W P-5'-P-CCNC: 35 U/L (ref 0–70)
ANION GAP SERPL CALCULATED.3IONS-SCNC: 5 MMOL/L (ref 3–14)
AST SERPL W P-5'-P-CCNC: 24 U/L (ref 0–45)
BILIRUB SERPL-MCNC: 1.2 MG/DL (ref 0.2–1.3)
BUN SERPL-MCNC: 21 MG/DL (ref 7–30)
CALCIUM SERPL-MCNC: 9.5 MG/DL (ref 8.5–10.1)
CHLORIDE SERPL-SCNC: 106 MMOL/L (ref 94–109)
CO2 SERPL-SCNC: 25 MMOL/L (ref 20–32)
CREAT SERPL-MCNC: 1.22 MG/DL (ref 0.66–1.25)
GFR SERPL CREATININE-BSD FRML MDRD: 79 ML/MIN/{1.73_M2}
GLUCOSE SERPL-MCNC: 81 MG/DL (ref 70–99)
POTASSIUM SERPL-SCNC: 4.6 MMOL/L (ref 3.4–5.3)
PROT SERPL-MCNC: 8.2 G/DL (ref 6.8–8.8)
SODIUM SERPL-SCNC: 136 MMOL/L (ref 133–144)

## 2019-02-05 LAB
TTG IGA SER-ACNC: 1 U/ML
TTG IGG SER-ACNC: 1 U/ML

## 2019-09-30 ENCOUNTER — HEALTH MAINTENANCE LETTER (OUTPATIENT)
Age: 29
End: 2019-09-30

## 2019-12-09 ENCOUNTER — HEALTH MAINTENANCE LETTER (OUTPATIENT)
Age: 29
End: 2019-12-09

## 2021-01-15 ENCOUNTER — HEALTH MAINTENANCE LETTER (OUTPATIENT)
Age: 31
End: 2021-01-15

## 2021-03-26 ENCOUNTER — OFFICE VISIT (OUTPATIENT)
Dept: FAMILY MEDICINE | Facility: CLINIC | Age: 31
End: 2021-03-26
Payer: COMMERCIAL

## 2021-03-26 VITALS
TEMPERATURE: 97.8 F | DIASTOLIC BLOOD PRESSURE: 76 MMHG | OXYGEN SATURATION: 97 % | BODY MASS INDEX: 28.06 KG/M2 | HEIGHT: 70 IN | WEIGHT: 196 LBS | SYSTOLIC BLOOD PRESSURE: 128 MMHG | HEART RATE: 80 BPM

## 2021-03-26 DIAGNOSIS — Z11.4 ENCOUNTER FOR SCREENING FOR HIV: ICD-10-CM

## 2021-03-26 DIAGNOSIS — Z11.59 NEED FOR HEPATITIS C SCREENING TEST: ICD-10-CM

## 2021-03-26 DIAGNOSIS — R00.2 FLUTTERING SENSATION OF HEART: ICD-10-CM

## 2021-03-26 DIAGNOSIS — Z13.220 LIPID SCREENING: ICD-10-CM

## 2021-03-26 DIAGNOSIS — R07.9 CHEST PAIN, UNSPECIFIED TYPE: Primary | ICD-10-CM

## 2021-03-26 LAB
ANION GAP SERPL CALCULATED.3IONS-SCNC: 5 MMOL/L (ref 3–14)
BUN SERPL-MCNC: 21 MG/DL (ref 7–30)
CALCIUM SERPL-MCNC: 9.8 MG/DL (ref 8.5–10.1)
CHLORIDE SERPL-SCNC: 107 MMOL/L (ref 94–109)
CHOLEST SERPL-MCNC: 213 MG/DL
CO2 SERPL-SCNC: 27 MMOL/L (ref 20–32)
CREAT SERPL-MCNC: 1.27 MG/DL (ref 0.66–1.25)
ERYTHROCYTE [DISTWIDTH] IN BLOOD BY AUTOMATED COUNT: 11.7 % (ref 10–15)
GFR SERPL CREATININE-BSD FRML MDRD: 74 ML/MIN/{1.73_M2}
GLUCOSE SERPL-MCNC: 75 MG/DL (ref 70–99)
HCT VFR BLD AUTO: 43.4 % (ref 40–53)
HCV AB SERPL QL IA: NONREACTIVE
HDLC SERPL-MCNC: 52 MG/DL
HGB BLD-MCNC: 14.7 G/DL (ref 13.3–17.7)
HIV 1+2 AB+HIV1 P24 AG SERPL QL IA: NONREACTIVE
LDLC SERPL CALC-MCNC: 151 MG/DL
MCH RBC QN AUTO: 30.2 PG (ref 26.5–33)
MCHC RBC AUTO-ENTMCNC: 33.9 G/DL (ref 31.5–36.5)
MCV RBC AUTO: 89 FL (ref 78–100)
NONHDLC SERPL-MCNC: 161 MG/DL
PLATELET # BLD AUTO: 255 10E9/L (ref 150–450)
POTASSIUM SERPL-SCNC: 4.6 MMOL/L (ref 3.4–5.3)
RBC # BLD AUTO: 4.86 10E12/L (ref 4.4–5.9)
SODIUM SERPL-SCNC: 139 MMOL/L (ref 133–144)
TRIGL SERPL-MCNC: 49 MG/DL
TSH SERPL DL<=0.005 MIU/L-ACNC: 1.17 MU/L (ref 0.4–4)
WBC # BLD AUTO: 4.4 10E9/L (ref 4–11)

## 2021-03-26 PROCEDURE — 85027 COMPLETE CBC AUTOMATED: CPT | Performed by: PHYSICIAN ASSISTANT

## 2021-03-26 PROCEDURE — 87389 HIV-1 AG W/HIV-1&-2 AB AG IA: CPT | Performed by: PHYSICIAN ASSISTANT

## 2021-03-26 PROCEDURE — 84443 ASSAY THYROID STIM HORMONE: CPT | Performed by: PHYSICIAN ASSISTANT

## 2021-03-26 PROCEDURE — 80048 BASIC METABOLIC PNL TOTAL CA: CPT | Performed by: PHYSICIAN ASSISTANT

## 2021-03-26 PROCEDURE — 36415 COLL VENOUS BLD VENIPUNCTURE: CPT | Performed by: PHYSICIAN ASSISTANT

## 2021-03-26 PROCEDURE — 80061 LIPID PANEL: CPT | Performed by: PHYSICIAN ASSISTANT

## 2021-03-26 PROCEDURE — 93000 ELECTROCARDIOGRAM COMPLETE: CPT | Performed by: PHYSICIAN ASSISTANT

## 2021-03-26 PROCEDURE — 99214 OFFICE O/P EST MOD 30 MIN: CPT | Performed by: PHYSICIAN ASSISTANT

## 2021-03-26 PROCEDURE — 86803 HEPATITIS C AB TEST: CPT | Performed by: PHYSICIAN ASSISTANT

## 2021-03-26 ASSESSMENT — ANXIETY QUESTIONNAIRES
6. BECOMING EASILY ANNOYED OR IRRITABLE: SEVERAL DAYS
3. WORRYING TOO MUCH ABOUT DIFFERENT THINGS: NEARLY EVERY DAY
2. NOT BEING ABLE TO STOP OR CONTROL WORRYING: SEVERAL DAYS
5. BEING SO RESTLESS THAT IT IS HARD TO SIT STILL: NOT AT ALL
1. FEELING NERVOUS, ANXIOUS, OR ON EDGE: NEARLY EVERY DAY
IF YOU CHECKED OFF ANY PROBLEMS ON THIS QUESTIONNAIRE, HOW DIFFICULT HAVE THESE PROBLEMS MADE IT FOR YOU TO DO YOUR WORK, TAKE CARE OF THINGS AT HOME, OR GET ALONG WITH OTHER PEOPLE: NOT DIFFICULT AT ALL
GAD7 TOTAL SCORE: 9
7. FEELING AFRAID AS IF SOMETHING AWFUL MIGHT HAPPEN: SEVERAL DAYS

## 2021-03-26 ASSESSMENT — PATIENT HEALTH QUESTIONNAIRE - PHQ9
SUM OF ALL RESPONSES TO PHQ QUESTIONS 1-9: 4
5. POOR APPETITE OR OVEREATING: NOT AT ALL

## 2021-03-26 ASSESSMENT — PAIN SCALES - GENERAL: PAINLEVEL: MILD PAIN (2)

## 2021-03-26 ASSESSMENT — MIFFLIN-ST. JEOR: SCORE: 1850.3

## 2021-03-26 NOTE — PROGRESS NOTES
"    Assessment & Plan     Chest pain, unspecified type  Fluttering sensation of heart  29 yo male with episodic CP and fluttering sensation x1 yr, but more frequent in the past 2 months. Without significant risk factors including smoking, obesity, DM, HTN, premature CAD in FHx. Occurs at rest or walking, but no issues with working out/exercise. Did complete EKG today which was normal. Labs pending to rule out anemia, thyroid disease, electrolyte abnormality. Preventative labs reviewed as noted below while here today. Fhx significant for anxiety in multiple family members and pt admits he feels this could anxiety as well since seems to notice it more the more he thinks about it. Discussed therapy, but he declines and doesn't feel it's limiting enough to warrant medication at this time.  DDX includes arrhythmia versus PVC/PAC and offered additional holter monitor if labs are normal. Pt will consider, but prefers to trial cutting out caffeine and alcohol to see if this helps first. Encouraged to follow-up if worsening, persistence or failure to improve as expected.   - EKG 12-lead complete w/read - Clinics  - CBC with platelets  - TSH with free T4 reflex  - Basic metabolic panel  (Ca, Cl, CO2, Creat, Gluc, K, Na, BUN)    Encounter for screening for HIV    - HIV Antigen Antibody Combo    Need for hepatitis C screening test    - **Hepatitis C Screen Reflex to RNA FUTURE anytime    Lipid screening    - Lipid panel reflex to direct LDL Fasting    BMI:   Estimated body mass index is 28.12 kg/m  as calculated from the following:    Height as of this encounter: 1.778 m (5' 10\").    Weight as of this encounter: 88.9 kg (196 lb).         Return in about 6 months (around 9/26/2021) for Preventive Visit.    Megan Mcguire PA-C  [facility] PRIOR MAITE Jain is a 30 year old who presents for the following health issues:    HPI   Chest Pain  Onset/Duration: 1 yr, but worse in the past 2 months. Had " "been only 1-2x per month and now is a couple times per week. Describes as a \"stomach ache in my chest\" and will occurs with \"fluttering\" sensation which feels makes him think about it more.   Admits could be more stress and heartburn or that he's just more aware or it's a \"mind game\".  Occurs at rest or with walking.   Never notices it with working out though or playing sports.  Non-smoker  Fhx: paternal grandfather passed from CAD in his 50's; smoker and drinker  Mom, sister and brother all have depression and/or anxiety. Sister in and out of the hospital for SI  Admits to increased anxiety over the past 2 yrs. Has family of his own now and 2 kids so feels this could be contributing  Has a good job, nice house and great family so denies big stressors or concerns otherwise.  Has never seen a therapist or counseling.  Feels has good support from friends.  Sochx: drinks 2 beers 1-2x per week. Sometimes will have none, depends on what's going on.  10 oz coffee cup per day with \"pre-workout\" drink containing caffeine 200mg daily - tries not to double up though  Tattoo - 1     Description:   Location: left side  Character: sharp   Radiation: on left side and fingers and feet tingle   Duration: 1 minutes and intermittent, resolves on it's own.  Intensity: mild; 5/10 at it's worst, but can be down to a zero  Progression of Symptoms: same  Accompanying Signs & Symptoms:  Shortness of breath: no  Sweating: no  Nausea/vomiting: no  Lightheadedness: no  Palpitations: no  Fever/Chills: no  Cough: no           Heartburn: no  History:   Family history of heart disease: no  Tobacco use: no  Previous similar symptoms: no   Precipitating factors:   Worse with exertion: no  Worse with deep breaths: no           Related to eating: no           Better with burping: no  Alleviating factors:   Therapies tried and outcome: None    Current Outpatient Medications   Medication     multivitamin, therapeutic with minerals (MULTI-VITAMIN) TABS " "tablet     No current facility-administered medications for this visit.      Allergies   Allergen Reactions     Amoxicillin Rash     Review of Systems   Constitutional, HEENT, cardiovascular, pulmonary, gi and gu, psych, neuro systems are negative, except as otherwise noted.      Objective    /76   Pulse 80   Temp 97.8  F (36.6  C) (Tympanic)   Ht 1.778 m (5' 10\")   Wt 88.9 kg (196 lb)   SpO2 97%   BMI 28.12 kg/m    Body mass index is 28.12 kg/m .  Physical Exam   GENERAL: healthy, alert and no distress  EYES: Eyes grossly normal to inspection, PERRL and conjunctivae and sclerae normal  NECK: no adenopathy, no asymmetry, masses, or scars and thyroid normal to palpation  RESP: lungs clear to auscultation - no rales, rhonchi or wheezes  CV: regular rates and rhythm and no murmur, click or rub  NEURO: Normal strength and tone, mentation intact and speech normal  PSYCH: mentation appears normal, affect normal/bright    EKG - Reviewed and interpreted by me appears normal, NSR, normal axis, normal intervals, no acute ST/T changes c/w ischemia, no LVH by voltage criteria, there are no prior tracings available    PHQ-9: 4  VOLODYMYR-7: 9            "

## 2021-03-27 ASSESSMENT — ANXIETY QUESTIONNAIRES: GAD7 TOTAL SCORE: 9

## 2021-04-01 DIAGNOSIS — R94.4 ABNORMAL RENAL FUNCTION TEST: Primary | ICD-10-CM

## 2021-04-01 NOTE — RESULT ENCOUNTER NOTE
Dear Cristobal,      Your recent test results are noted below:    -Normal red blood cell (hgb) levels, normal white blood cell count and normal platelet levels.  -LDL(bad) cholesterol level is elevated which can increase your heart disease risk.  A diet high in fat and simple carbohydrates, genetics and being overweight can contribute to this. ADVISE: exercising 150 minutes of aerobic exercise per week (30 minutes for 5 days per week or 50 minutes for 3 days per week are options) and eating a low saturated fat/low carbohydrate diet are helpful to improve this.  -Kidney function (GFR) is mildly abnormal. This can have multitude of causes including dehydration, kidney damaging medications like ibuprofen. I don't this this was related to your chest pain, but would like for you to repeat this in a month. Please avoid advil/ibuprofen/motrin/aleve and stay well-hydrated. A future order was placed for lab only appointment.  -Sodium is normal.  -Potassium is normal.  -Calcium is normal.  -Glucose (diabetic screening test) is normal.  -TSH (thyroid stimulating hormone) level is normal which indicates normal thyroid function.  -Hepatitis C antibody screen test shows no signs of a previous hepatitis C infection.  -HIV test is normal.    For additional lab test information, [email or web address] is an excellent reference. Please contact the clinic at [phone] with any further questions or concerns.    Sincerely,      Megan Mcguire PA-C  [facility]

## 2021-04-01 NOTE — RESULT ENCOUNTER NOTE
Result(s) was/were reviewed in the clinic with patient at time of appointment.  Electronically Signed By: Megan Mcguire PA-C

## 2021-06-11 DIAGNOSIS — R94.4 ABNORMAL RENAL FUNCTION TEST: ICD-10-CM

## 2021-06-11 LAB
ANION GAP SERPL CALCULATED.3IONS-SCNC: 5 MMOL/L (ref 3–14)
BUN SERPL-MCNC: 15 MG/DL (ref 7–30)
CALCIUM SERPL-MCNC: 9.8 MG/DL (ref 8.5–10.1)
CHLORIDE SERPL-SCNC: 105 MMOL/L (ref 94–109)
CO2 SERPL-SCNC: 27 MMOL/L (ref 20–32)
CREAT SERPL-MCNC: 1.24 MG/DL (ref 0.66–1.25)
GFR SERPL CREATININE-BSD FRML MDRD: 76 ML/MIN/{1.73_M2}
GLUCOSE SERPL-MCNC: 88 MG/DL (ref 70–99)
POTASSIUM SERPL-SCNC: 4.2 MMOL/L (ref 3.4–5.3)
SODIUM SERPL-SCNC: 137 MMOL/L (ref 133–144)

## 2021-06-11 PROCEDURE — 80048 BASIC METABOLIC PNL TOTAL CA: CPT | Performed by: PHYSICIAN ASSISTANT

## 2021-06-11 PROCEDURE — 36415 COLL VENOUS BLD VENIPUNCTURE: CPT | Performed by: PHYSICIAN ASSISTANT

## 2021-06-16 NOTE — RESULT ENCOUNTER NOTE
Dear Cristobal,      Your recent test results are noted below:    -Kidney function is normal (Cr, GFR), Sodium is normal, Potassium is normal, Calcium is normal, Glucose is normal.     For additional lab test information, [email or web address] is an excellent reference. Please contact the clinic at [phone] with any further questions or concerns.    Sincerely,      Megan Mcguire PA-C  [facility]

## 2021-10-24 ENCOUNTER — HEALTH MAINTENANCE LETTER (OUTPATIENT)
Age: 31
End: 2021-10-24

## 2022-01-10 ENCOUNTER — IMMUNIZATION (OUTPATIENT)
Dept: NURSING | Facility: CLINIC | Age: 32
End: 2022-01-10
Payer: COMMERCIAL

## 2022-01-10 DIAGNOSIS — Z23 HIGH PRIORITY FOR 2019-NCOV VACCINE: Primary | ICD-10-CM

## 2022-01-10 PROCEDURE — 91305 COVID-19,PF,PFIZER (12+ YRS): CPT

## 2022-01-10 PROCEDURE — 0051A COVID-19,PF,PFIZER (12+ YRS): CPT

## 2022-01-31 ENCOUNTER — IMMUNIZATION (OUTPATIENT)
Dept: NURSING | Facility: CLINIC | Age: 32
End: 2022-01-31
Attending: INTERNAL MEDICINE
Payer: COMMERCIAL

## 2022-01-31 DIAGNOSIS — Z23 HIGH PRIORITY FOR 2019-NCOV VACCINE: Primary | ICD-10-CM

## 2022-01-31 PROCEDURE — 91305 COVID-19,PF,PFIZER (12+ YRS): CPT

## 2022-01-31 PROCEDURE — 0052A COVID-19,PF,PFIZER (12+ YRS): CPT

## 2022-02-13 ENCOUNTER — HEALTH MAINTENANCE LETTER (OUTPATIENT)
Age: 32
End: 2022-02-13

## 2022-02-15 ENCOUNTER — OFFICE VISIT (OUTPATIENT)
Dept: FAMILY MEDICINE | Facility: CLINIC | Age: 32
End: 2022-02-15
Payer: COMMERCIAL

## 2022-02-15 VITALS
OXYGEN SATURATION: 97 % | WEIGHT: 190 LBS | HEART RATE: 85 BPM | BODY MASS INDEX: 28.14 KG/M2 | SYSTOLIC BLOOD PRESSURE: 120 MMHG | DIASTOLIC BLOOD PRESSURE: 70 MMHG | TEMPERATURE: 96.5 F | HEIGHT: 69 IN | RESPIRATION RATE: 18 BRPM

## 2022-02-15 DIAGNOSIS — R59.9 SWOLLEN LYMPH NODES: Primary | ICD-10-CM

## 2022-02-15 LAB — DEPRECATED S PYO AG THROAT QL EIA: NEGATIVE

## 2022-02-15 PROCEDURE — 87651 STREP A DNA AMP PROBE: CPT | Performed by: NURSE PRACTITIONER

## 2022-02-15 PROCEDURE — 99213 OFFICE O/P EST LOW 20 MIN: CPT | Performed by: NURSE PRACTITIONER

## 2022-02-15 ASSESSMENT — ENCOUNTER SYMPTOMS
TROUBLE SWALLOWING: 1
SHORTNESS OF BREATH: 0
SORE THROAT: 1
FEVER: 0
CHILLS: 0

## 2022-02-15 ASSESSMENT — MIFFLIN-ST. JEOR: SCORE: 1802.21

## 2022-02-15 NOTE — PATIENT INSTRUCTIONS
Results for orders placed or performed in visit on 02/15/22   Streptococcus A Rapid Screen w/Reflex to PCR - Clinic Collect     Status: Normal    Specimen: Throat; Swab   Result Value Ref Range    Group A Strep antigen Negative Negative     Ibuprofen or Tylenol as needed for discomfort.     Increase rest and fluids.      Trial of decongestant or antihistamine (Cetirizine or Loratadine) to help with possible post-nasal drip.

## 2022-02-15 NOTE — PROGRESS NOTES
"  Assessment & Plan     Swollen lymph nodes - intermittent  Rapid strep test negative. Will call with PCR results. Discussed symptomatic relief such as Tylenol, Ibuprofen, and antihistamine to help with possible postnasal drip. Declined COVID test in clinic.   - Streptococcus A Rapid Screen w/Reflex to PCR - Clinic Collect  - Group A Streptococcus PCR Throat Swab       Follow-up  Return in about 1 week (around 2/22/2022) for No improvement or sooner with worsening symptoms.     SAMMY BrownN, RN, FNP DNP student     This patient was seen alongside a student nurse practitioner.  The history, reviews of systems, objective data, and assessment/plan were completed by myself.    Anna Marie Padron, APRN CNP  [facility] MAITE Jain is a 31 year old who presents for the following health issues     HPI     Had a cold a couple of weeks ago, took a COVID test which was negative.     Acute Illness  Acute illness concerns: Sore Throat- lymph nodes feel swollen, hurts when he swallows, worse at night   Onset/Duration: off and on x 1 week  Symptoms:  Fever: no  Chills/Sweats: no  Headache (location?): no  Sinus Pressure: no  Conjunctivitis:  no  Ear Pain: no  Rhinorrhea: no  Congestion: no  Cough: no  Wheeze: no  Decreased Appetite: no  Nausea: no  Vomiting: no  Diarrhea: no  Dysuria/Freq.: no  Dysuria or Hematuria: no  Fatigue/Achiness: no  Sick/Strep Exposure: no  Therapies tried and outcome: None      Review of Systems   Constitutional: Negative for chills and fever.   HENT: Positive for sore throat and trouble swallowing. Negative for congestion and ear pain.    Respiratory: Negative for shortness of breath.             Objective    /70   Pulse 85   Temp (!) 96.5  F (35.8  C) (Tympanic)   Resp 18   Ht 1.753 m (5' 9\")   Wt 86.2 kg (190 lb)   SpO2 97%   BMI 28.06 kg/m    Body mass index is 28.06 kg/m .  Physical Exam  Constitutional:       Appearance: Normal appearance.   HENT:      " Right Ear: Tympanic membrane and ear canal normal.      Left Ear: Tympanic membrane and ear canal normal.      Nose: Nose normal.      Mouth/Throat:      Pharynx: Oropharynx is clear. Uvula midline. No oropharyngeal exudate.      Tonsils: No tonsillar exudate. 1+ on the right. 1+ on the left.   Neurological:      Mental Status: He is alert.

## 2022-02-16 LAB — GROUP A STREP BY PCR: NOT DETECTED

## 2022-02-17 NOTE — RESULT ENCOUNTER NOTE
Dear Cristobal,     Strep PCR was negative.          Please send a [employer] message or call [phone]  if you have any questions.      MACARENA Dunbar, CNP  [facility]    If you have further questions about the interpretation of your labs, [email or web address] is a good website to check out for further information.

## 2022-09-16 NOTE — PROGRESS NOTES
ASSESSMENT & PLAN    1. Acute pain of left shoulder    2. Contusion of left shoulder, initial encounter    3. Status post repair of glenoid labrum      Cristobal Rodriguez Jr. is a 32 year old right handed male with a distant history of left glenoid labrum repair presenting for evaluation of acute left shoulder pain after falling into boards during hockey 2 weeks ago. History, examination and imaging were reviewed. Etiology is of his symptoms include contusion versus labral reinjury versus rotator cuff injury. Fortunately, motion and strength are intact on exam today without evidence of instability. We reviewed treatment plan inclusive of pain management (topicals, NSAIDS, steroid injection), activity modification (avoiding painful activities), formal physical therapy and timing of advance imaging (ie MRI).      At this time, plan to proceed with the following:  - Rx for diclofenac (Voltaren) 75 mg tabs sent to pharmacy. Take 1 tab with food every 12 hours for the next 1-2 weeks, the decrease to as-needed. Do not use any other NSAIDS like ibuprofen.  - Ice the shoulder for 10-15 minutes 43-4 times per day.  - Activity as tolerated based on pain.   - Continue working on gentle shoulder range of motion and start formal physical therapy - exercises to include pain free range of motion of the shoulder, scapular stabilization, stretching of the shoulder capsule, strengthening of the periscapular, rotator cuff, and deltoid muscles, with progression to functional rehabilitation with home exercise prescription.  - Low threshold to proceed with advanced imaging if symptoms do not improve.     Please schedule a follow up appointment to see me in about 2 weeks, or sooner as needed for persistence or worsening of pain. If pain persists at that time, we will go ahead with an MR Arthrogram. You may call our direct clinic number ([phone]) at any time with questions or concerns.    Nahomi Grossman MD, [facility]    -----    SUBJECTIVE  Cristobal Rodriguez Jr. is a/an 32 year old Right handed male who is seen as a self referral for evaluation of left shoulder pain. The patient is seen by themselves. Injury occurred 2 weeks ago when he fell in to the boards playing hockey. Initially he had an abrasion on shoulder and swelling, swelling and range of motion have been improving.     Onset: 2 week(s) ago. Patient describes injury as playing hockey and fell in to boards without shoulder pads on.  Location of Pain: left shoulder  Rating of Pain at worst: 8/10  Rating of Pain Currently: 0/10 at rest, 7-8/10 with movement  Worsened by: shoulder extension  Better with: rest  Treatments tried: rest/activity avoidance, ice and stretching  Associated symptoms: pain, swelling  Orthopedic history: YES   Relevant surgical history: YES - history of left shoulder labral repair 8-9 years ago  Social history: works in [employer]. Enjoys hockey, running, weight lifting.    Past Medical History:   Diagnosis Date     NO ACTIVE PROBLEMS      Social History     Socioeconomic History     Marital status:    Tobacco Use     Smoking status: Never Smoker     Smokeless tobacco: Never Used   Substance and Sexual Activity     Alcohol use: Yes     Comment: once weekly     Drug use: No     Sexual activity: Yes     Partners: Female     Birth control/protection: Pill   Other Topics Concern     Parent/sibling w/ CABG, MI or angioplasty before 65F 55M? No   Social History Narrative    11/2018    Works at [employer] supervisor.         Lives with wife and son (9 months), 2 dogs.         Enjoys hockey, working out, video games.         Patient's past medical, surgical, social, and family histories were reviewed today and no changes are noted.    REVIEW OF SYSTEMS:  10 point ROS is negative other than symptoms noted above in HPI, Past Medical History or as stated below  Constitutional: NEGATIVE for fever, chills, change in  "weight  Skin: NEGATIVE for worrisome rashes, moles or lesions  GI/: NEGATIVE for bowel or bladder changes  Neuro: NEGATIVE for weakness, dizziness or paresthesias    OBJECTIVE:  BP (!) 144/88   Ht 1.778 m (5' 10\")   Wt 87.7 kg (193 lb 6.4 oz)   BMI 27.75 kg/m     General: healthy, alert and in no distress  HEENT: no scleral icterus or conjunctival erythema  Skin: no suspicious lesions or rash. No jaundice.  CV: regular rhythm by palpation  Resp: normal respiratory effort without conversational dyspnea   Psych: normal mood and affect  Gait: normal steady gait with appropriate coordination and balance  Neuro: normal light touch sensory exam of the bilateral upper extremities.    MSK:  LEFT SHOULDER  Inspection:    Resolving swelling over the posterior shoulder. No swelling, ecchymosis, discoloration, or obvious deformity or asymmetry  Palpation:    Tender about the supraspinatus insertion and posterior shoulder/capsule. Remainder of bony and tendinous landmarks are nontender.  Active Range of Motion:     Abduction 1800, FF 1800, , IR T10. Pain at end range throughout      Scapular dyskinesis absent  Strength:    Scapular plane abduction 5/5 painful,  ER 5/5, IR 5/5, biceps 5/5, triceps 5/5  Special Tests:    Positive: [place]'s, load and shift; mild Neer's and Mendez'    Negative: Supraspinatus (empty can), lift-off, apprehension/relocation, sulcus sign, clunk      Independent visualization of the below image:  Recent Results (from the past 24 hour(s))   XR Shoulder Left G/E 3 Views    Narrative    XR SHOULDER LEFT G/E 3 VIEWS  9/26/2022 10:49 AM     HISTORY: Acute pain of left shoulder  COMPARISON: None      Impression    IMPRESSION: No acute fracture or malalignment. No significant  degenerative changes. Status post labral repair. No hardware  complication.    EDITH NOBLE MD         SYSTEM ID:  [id number]           Nahomi Grossman MD, [facility]    "

## 2022-09-26 ENCOUNTER — OFFICE VISIT (OUTPATIENT)
Dept: ORTHOPEDICS | Facility: CLINIC | Age: 32
End: 2022-09-26
Payer: COMMERCIAL

## 2022-09-26 VITALS
SYSTOLIC BLOOD PRESSURE: 144 MMHG | HEIGHT: 70 IN | WEIGHT: 193.4 LBS | BODY MASS INDEX: 27.69 KG/M2 | DIASTOLIC BLOOD PRESSURE: 88 MMHG

## 2022-09-26 DIAGNOSIS — S40.012A CONTUSION OF LEFT SHOULDER, INITIAL ENCOUNTER: ICD-10-CM

## 2022-09-26 DIAGNOSIS — Z98.890 STATUS POST REPAIR OF GLENOID LABRUM: ICD-10-CM

## 2022-09-26 DIAGNOSIS — M25.512 ACUTE PAIN OF LEFT SHOULDER: Primary | ICD-10-CM

## 2022-09-26 PROCEDURE — 99204 OFFICE O/P NEW MOD 45 MIN: CPT | Performed by: STUDENT IN AN ORGANIZED HEALTH CARE EDUCATION/TRAINING PROGRAM

## 2022-09-26 RX ORDER — DICLOFENAC SODIUM 75 MG/1
75 TABLET, DELAYED RELEASE ORAL 2 TIMES DAILY
Qty: 28 TABLET | Refills: 0 | Status: SHIPPED | OUTPATIENT
Start: 2022-09-26 | End: 2023-03-08

## 2022-09-26 NOTE — PATIENT INSTRUCTIONS
1. Acute pain of left shoulder    2. Contusion of left shoulder, initial encounter    3. Status post repair of glenoid labrum      Cristobal Rodriguez Jr. is a 32 year old right handed male with a distant history of left glenoid labrum repair presenting for evaluation of acute left shoulder pain after falling into boards during hockey 2 weeks ago. History, examination and imaging were reviewed. Etiology is of his symptoms include contusion versus labral reinjury versus rotator cuff injury. Fortunately, motion and strength are intact on exam today without evidence of instability. We reviewed treatment plan inclusive of pain management (topicals, NSAIDS, steroid injection), activity modification (avoiding painful activities), formal physical therapy and timing of advance imaging (ie MRI).      At this time, plan to proceed with the following:  - Rx for diclofenac (Voltaren) 75 mg tabs sent to pharmacy. Take 1 tab with food every 12 hours for the next 1-2 weeks, the decrease to as-needed. Do not use any other NSAIDS like ibuprofen.  - Ice the shoulder for 10-15 minutes 43-4 times per day.  - Activity as tolerated based on pain.   - Referral placed for physical therapy - exercises to include pain free range of motion of the shoulder, scapular stabilization, stretching of the shoulder capsule, strengthening of the periscapular, rotator cuff, and deltoid muscles, with progression to functional rehabilitation with home exercise prescription.    Please schedule a follow up appointment to see me in about 2 weeks, or sooner as needed for persistence or worsening of pain. If pain persists at that time, we will go ahead with an MR Arthrogram. You may call our direct clinic number ([phone]) at any time with questions or concerns.    Nahomi Grossman MD, [facility]

## 2022-09-26 NOTE — LETTER
9/26/2022         RE: Cristobal Rodriguez Jr.  [address] [place] [address]        Dear Colleague,    Thank you for referring your patient, Cristobal Rodriguez Jr., to the [facility] [place]. Please see a copy of my visit note below.    ASSESSMENT & PLAN    1. Acute pain of left shoulder    2. Contusion of left shoulder, initial encounter    3. Status post repair of glenoid labrum      Cristobal Rodriguez Jr. is a 32 year old right handed male with a distant history of left glenoid labrum repair presenting for evaluation of acute left shoulder pain after falling into boards during hockey 2 weeks ago. History, examination and imaging were reviewed. Etiology is of his symptoms include contusion versus labral reinjury versus rotator cuff injury. Fortunately, motion and strength are intact on exam today without evidence of instability. We reviewed treatment plan inclusive of pain management (topicals, NSAIDS, steroid injection), activity modification (avoiding painful activities), formal physical therapy and timing of advance imaging (ie MRI).      At this time, plan to proceed with the following:  - Rx for diclofenac (Voltaren) 75 mg tabs sent to pharmacy. Take 1 tab with food every 12 hours for the next 1-2 weeks, the decrease to as-needed. Do not use any other NSAIDS like ibuprofen.  - Ice the shoulder for 10-15 minutes 43-4 times per day.  - Activity as tolerated based on pain.   - Continue working on gentle shoulder range of motion and start formal physical therapy - exercises to include pain free range of motion of the shoulder, scapular stabilization, stretching of the shoulder capsule, strengthening of the periscapular, rotator cuff, and deltoid muscles, with progression to functional rehabilitation with home exercise prescription.  - Low threshold to proceed with advanced imaging if symptoms do not improve.     Please schedule a follow up appointment to see me in about 2 weeks, or sooner  as needed for persistence or worsening of pain. If pain persists at that time, we will go ahead with an MR Arthrogram. You may call our direct clinic number ([phone]) at any time with questions or concerns.    Nahomi Grossman MD, [facility]    -----    SUBJECTIVE  Cristobal Rodriguez Jr. is a/an 32 year old Right handed male who is seen as a self referral for evaluation of left shoulder pain. The patient is seen by themselves. Injury occurred 2 weeks ago when he fell in to the boards playing hockey. Initially he had an abrasion on shoulder and swelling, swelling and range of motion have been improving.     Onset: 2 week(s) ago. Patient describes injury as playing hockey and fell in to boards without shoulder pads on.  Location of Pain: left shoulder  Rating of Pain at worst: 8/10  Rating of Pain Currently: 0/10 at rest, 7-8/10 with movement  Worsened by: shoulder extension  Better with: rest  Treatments tried: rest/activity avoidance, ice and stretching  Associated symptoms: pain, swelling  Orthopedic history: YES   Relevant surgical history: YES - history of left shoulder labral repair 8-9 years ago  Social history: works in [employer]. Enjoys hockey, running, weight lifting.    Past Medical History:   Diagnosis Date     NO ACTIVE PROBLEMS      Social History     Socioeconomic History     Marital status:    Tobacco Use     Smoking status: Never Smoker     Smokeless tobacco: Never Used   Substance and Sexual Activity     Alcohol use: Yes     Comment: once weekly     Drug use: No     Sexual activity: Yes     Partners: Female     Birth control/protection: Pill   Other Topics Concern     Parent/sibling w/ CABG, MI or angioplasty before 65F 55M? No   Social History Narrative    11/2018    Works at [employer] supervisor.         Lives with wife and son (9 months), 2 dogs.         Enjoys hockey, working out, video games.         Patient's past medical, surgical, social,  "and family histories were reviewed today and no changes are noted.    REVIEW OF SYSTEMS:  10 point ROS is negative other than symptoms noted above in HPI, Past Medical History or as stated below  Constitutional: NEGATIVE for fever, chills, change in weight  Skin: NEGATIVE for worrisome rashes, moles or lesions  GI/: NEGATIVE for bowel or bladder changes  Neuro: NEGATIVE for weakness, dizziness or paresthesias    OBJECTIVE:  BP (!) 144/88   Ht 1.778 m (5' 10\")   Wt 87.7 kg (193 lb 6.4 oz)   BMI 27.75 kg/m     General: healthy, alert and in no distress  HEENT: no scleral icterus or conjunctival erythema  Skin: no suspicious lesions or rash. No jaundice.  CV: regular rhythm by palpation  Resp: normal respiratory effort without conversational dyspnea   Psych: normal mood and affect  Gait: normal steady gait with appropriate coordination and balance  Neuro: normal light touch sensory exam of the bilateral upper extremities.    MSK:  LEFT SHOULDER  Inspection:    Resolving swelling over the posterior shoulder. No swelling, ecchymosis, discoloration, or obvious deformity or asymmetry  Palpation:    Tender about the supraspinatus insertion and posterior shoulder/capsule. Remainder of bony and tendinous landmarks are nontender.  Active Range of Motion:     Abduction 1800, FF 1800, , IR T10. Pain at end range throughout      Scapular dyskinesis absent  Strength:    Scapular plane abduction 5/5 painful,  ER 5/5, IR 5/5, biceps 5/5, triceps 5/5  Special Tests:    Positive: [place]'s, load and shift; mild Neer's and Mendez'    Negative: Supraspinatus (empty can), lift-off, apprehension/relocation, sulcus sign, clunk      Independent visualization of the below image:  No results found for this or any previous visit (from the past 24 hour(s)).        Nahomi Grossman MD, [facility]        Again, thank you for allowing me to participate in the care of your patient.  "       Sincerely,        Nahomi Grossman MD

## 2022-10-15 ENCOUNTER — HEALTH MAINTENANCE LETTER (OUTPATIENT)
Age: 32
End: 2022-10-15

## 2022-11-08 ASSESSMENT — ANXIETY QUESTIONNAIRES
GAD7 TOTAL SCORE: 19
6. BECOMING EASILY ANNOYED OR IRRITABLE: NEARLY EVERY DAY
7. FEELING AFRAID AS IF SOMETHING AWFUL MIGHT HAPPEN: NEARLY EVERY DAY
8. IF YOU CHECKED OFF ANY PROBLEMS, HOW DIFFICULT HAVE THESE MADE IT FOR YOU TO DO YOUR WORK, TAKE CARE OF THINGS AT HOME, OR GET ALONG WITH OTHER PEOPLE?: SOMEWHAT DIFFICULT
4. TROUBLE RELAXING: NEARLY EVERY DAY
GAD7 TOTAL SCORE: 19
7. FEELING AFRAID AS IF SOMETHING AWFUL MIGHT HAPPEN: NEARLY EVERY DAY
1. FEELING NERVOUS, ANXIOUS, OR ON EDGE: NEARLY EVERY DAY
GAD7 TOTAL SCORE: 19
2. NOT BEING ABLE TO STOP OR CONTROL WORRYING: NEARLY EVERY DAY
IF YOU CHECKED OFF ANY PROBLEMS ON THIS QUESTIONNAIRE, HOW DIFFICULT HAVE THESE PROBLEMS MADE IT FOR YOU TO DO YOUR WORK, TAKE CARE OF THINGS AT HOME, OR GET ALONG WITH OTHER PEOPLE: SOMEWHAT DIFFICULT
3. WORRYING TOO MUCH ABOUT DIFFERENT THINGS: NEARLY EVERY DAY
5. BEING SO RESTLESS THAT IT IS HARD TO SIT STILL: SEVERAL DAYS

## 2022-11-09 ENCOUNTER — HOSPITAL ENCOUNTER (OUTPATIENT)
Dept: BEHAVIORAL HEALTH | Facility: CLINIC | Age: 32
Discharge: HOME | End: 2022-11-09
Attending: FAMILY MEDICINE | Admitting: FAMILY MEDICINE
Payer: COMMERCIAL

## 2022-11-09 DIAGNOSIS — F41.9 ANXIETY DISORDER, UNSPECIFIED TYPE: ICD-10-CM

## 2022-11-09 PROCEDURE — 90791 PSYCH DIAGNOSTIC EVALUATION: CPT | Mod: GT,95 | Performed by: COUNSELOR

## 2022-11-09 ASSESSMENT — COLUMBIA-SUICIDE SEVERITY RATING SCALE - C-SSRS
ATTEMPT LIFETIME: NO
1. HAVE YOU WISHED YOU WERE DEAD OR WISHED YOU COULD GO TO SLEEP AND NOT WAKE UP?: NO
TOTAL  NUMBER OF INTERRUPTED ATTEMPTS LIFETIME: NO
2. HAVE YOU ACTUALLY HAD ANY THOUGHTS OF KILLING YOURSELF?: NO
6. HAVE YOU EVER DONE ANYTHING, STARTED TO DO ANYTHING, OR PREPARED TO DO ANYTHING TO END YOUR LIFE?: NO
TOTAL  NUMBER OF ABORTED OR SELF INTERRUPTED ATTEMPTS LIFETIME: NO

## 2022-11-09 ASSESSMENT — PATIENT HEALTH QUESTIONNAIRE - PHQ9
SUM OF ALL RESPONSES TO PHQ QUESTIONS 1-9: 2
10. IF YOU CHECKED OFF ANY PROBLEMS, HOW DIFFICULT HAVE THESE PROBLEMS MADE IT FOR YOU TO DO YOUR WORK, TAKE CARE OF THINGS AT HOME, OR GET ALONG WITH OTHER PEOPLE: SOMEWHAT DIFFICULT
SUM OF ALL RESPONSES TO PHQ QUESTIONS 1-9: 2

## 2022-11-09 NOTE — PROGRESS NOTES
"[facility]  Provider Name:  Callie Ibarra     Credentials:  LPCC, LADC    PATIENT'S NAME: Cristobal Rodriguez Jr.  PREFERRED NAME: Cristobal  PRONOUNS: he/him  MRN: [id number]  : 1990  ADDRESS: [address] [address]  ACCT. NUMBER:  [id number]  DATE OF SERVICE: 22  START TIME: 12:00pm  END TIME: 12:43pm  PREFERRED PHONE: [phone]  May we leave a program related message: Yes  SERVICE MODALITY:  Video Visit:      Provider verified identity through the following two step process.  Patient provided:  Patient     Telemedicine Visit: The patient's condition can be safely assessed and treated via synchronous audio and visual telemedicine encounter.      Reason for Telemedicine Visit: Services only offered telehealth    Originating Site (Patient Location): Patient's home    Distant Site (Provider Location): Provider Remote Setting- Home Office    Consent:  The patient/guardian has verbally consented to: the potential risks and benefits of telemedicine (video visit) versus in person care; bill my insurance or make self-payment for services provided; and responsibility for payment of non-covered services.     Patient would like the video invitation sent by:  My Chart    Mode of Communication:  Video Conference via Amwell    Distant Location (Provider):  Off-site    As the provider I attest to compliance with applicable laws and regulations related to telemedicine.    UNIVERSAL ADULT Mental Health DIAGNOSTIC ASSESSMENT    Identifying Information:  Patient is a 32 year old,   individual.    Patient was referred for an assessment by self.  Patient attended the session alone.    Chief Complaint:   The reason for seeking services at this time is: \"Anxiety and anger concerns.\".  The problem(s) began 16.    Patient has not attempted to resolve these concerns in the past.    Social/Family History:  Patient reported they grew up in other " [facility].  They were raised by biological parents.  Parents one or both remarried. Mom remarried when he was around age 12-14.  He has four full siblings, two half siblings, and one step sibling.  Patient reported that their childhood was straight forward. Patient reports he was with his dad primarily growing up but did spend some time in [place] with his mother until moving back to [place] and living with his dad. Patient reports he grew up with all of his full siblings. Patient states, when they split, my sister and brother went back and forth with mom and dad at different times. Half siblings never lived with growing up and same with step sibling growing up.  Patient described their current relationships with family of origin as father is closest connection, connect with mom but she is in [place] but still good relationship. Good relationships with all of siblings. Closest relationship is with my brother who is youngest of all of full siblings.     The patient describes their cultural background as .  Cultural influences and impact on patient's life structure, values, norms, and healthcare: N/A.  Contextual influences on patient's health include:Occupational stress and Covid Pandemic.  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 Patient states, full support and doing a good job but internal stress, plays into it. For everyone, Covid what anxiety I did have and what covid did to that. Having a family and worry about everyone.  These factors will be addressed in the Preliminary Treatment plan. Patient identified their preferred language to be English. Patient reported they does not need the assistance of an  or other support involved in therapy.     Patient reported had no significant delays in developmental tasks.   Patient's highest education level was college graduate.  Patient identified the following learning problems: none reported.  Modifications will not be  "used to assist communication in therapy.  Patient reports they are  able to understand written materials.    Patient reported the following relationship history as only one legal marriage.  Patient's current relationship status is  since 2016 been together since 2008.   Patient identified their sexual orientation as heterosexual.  Patient reported having 2 child(manuela). One 2 year old son and one 4 year old son. Patient identified father; friends; spouse as part of their support system.  Patient identified the quality of these relationships as good.      Patient's current living/housing situation involves staying in own home/apartment.  The immediate members of family and household include Nahomi Rodriguez, Michael,Wife and two children and they report that housing is stable.    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 Patient reports their finances are obtained through employment; spouse. Patient does identify finances as a current stressor.       Patient reported that they have not been involved with the legal system.  Patient does not report being under probation/ parole/ jurisdiction.     Patient's Strengths and Limitations:  Patient identified the following strengths or resources that will help them succeed in treatment: friends / good social support, family support, motivation and work ethic. Things that may interfere with the patient's success in treatment include: none identified.     Personal and Family Medical History:  Patient does report a family history of mental health concerns.  Patient reports family history includes Depression in his brother, maternal grandmother, mother, and sister; Hypertension in his father and sister; Myocardial Infarction in his paternal grandfather..     Patient does not report Mental Health Diagnosis or Treatment.     Per EHR progress note on 3/26/21: \"Mom, sister and brother all have depression and/or anxiety. " "Sister in and out of the hospital for SI  Admits to increased anxiety over the past 2 yrs. Has family of his own now and 2 kids so feels this could be contributing  Has a good job, nice house and great family so denies big stressors or concerns otherwise.  Has never seen a therapist or counseling.  Feels has good support from friends\".    Patient has not had a physical exam to rule out medical causes for current symptoms.  Date of last physical exam was greater than a year ago but has been seen within the last year by primary care. The patient has a [facility] Primary Care Provider, who is named Michele Laird and Anna Marie Padron APRN CNP.   Patient reports no current medical concerns.  Patient denies any issues with pain.   There are not significant appetite / nutritional concerns / weight changes.   Patient does not report a history of head injury / trauma / cognitive impairment.      Patient reports current meds as:   Current Outpatient Medications   Medication Sig     diclofenac (VOLTAREN) 75 MG EC tablet Take 1 tablet (75 mg) by mouth 2 times daily for 14 days Always take with food. Do not use any other NSAIDS like Ibuprofen or Advil.     multivitamin w/minerals (THERA-VIT-M) tablet Take 1 tablet by mouth daily     No current facility-administered medications for this encounter.     Medication Adherence:  Patient reports not currently prescribed.      Patient Allergies:    Allergies   Allergen Reactions     Amoxicillin Rash       Medical History:    Past Medical History:   Diagnosis Date     NO ACTIVE PROBLEMS      Current Mental Status Exam:   Appearance:  Appropriate    Eye Contact:  Good   Psychomotor:  Normal       Gait / station:  no problem  Attitude / Demeanor: Cooperative  Friendly Pleasant  Speech      Rate / Production: Normal/ Responsive      Volume:  Normal  volume      Language:  intact  Mood:   Normal  Affect:   Appropriate    Thought Content: Clear   Thought Process: Coherent       " Associations: No loosening of associations  Insight:   Good   Judgment:  Intact   Orientation:  All  Attention/concentration: Good    Substance Use:  Patient did not report a family history of substance use concerns; see medical history section for details.  Patient has not received chemical dependency treatment in the past.  Patient has not ever been to detox.      Patient is not currently receiving any chemical dependency treatment.         Substance History of use Age of first use Date of last use     Pattern and duration of use (include amounts and frequency)   Alcohol currently use   15 11/06/22 Per patient: sometimes will go weeks without having a drink. Two night a week sometimes. Never to extent to drinking heavily, here or there. Enjoy social or edge off.    Cannabis   used in the past 14 06/17/22 Per patient:very infrequent. Do not like what it does to mental focus to function.Once every since months or once a year.    Amphetamines   never used     REPORTS SUBSTANCE USE: N/A   Cocaine/crack    never used       REPORTS SUBSTANCE USE: N/A   Hallucinogens never used         REPORTS SUBSTANCE USE: N/A   Inhalants never used         REPORTS SUBSTANCE USE: N/A   Heroin never used         REPORTS SUBSTANCE USE: N/A   Other Opiates never used     REPORTS SUBSTANCE USE: N/A   Benzodiazepine   never used     REPORTS SUBSTANCE USE: N/A   Barbiturates never used     REPORTS SUBSTANCE USE: N/A   Over the counter meds never used     REPORTS SUBSTANCE USE: N/A   Caffeine currently use 17 11/9/22 Per patient: love coffee everyday.    Nicotine  never used     REPORTS SUBSTANCE USE: N/A   Other substances not listed above:  Identify:  never used     REPORTS SUBSTANCE USE: N/A     Patient reported the following problems as a result of their substance use: no problems, not applicable.    Substance Use: No symptoms    Based on the positive CAGE score and clinical interview there  are not indications of drug or alcohol abuse  based off clinical interview with patient.    Significant Losses / Trauma / Abuse / Neglect Issues:   Patient did not serve in the .  There are indications or report of significant loss, trauma, abuse or neglect issues related to: are no indications and client denies any losses, trauma, abuse, or neglect concerns.  Patient states, unexpected loss of cousin a year ago, but do not feel it has had significant impact.  Concerns for possible neglect are not present.     Assessments:   The following assessments were completed by patient for this visit:  PHQ9:   PHQ-9 SCORE 3/26/2021 11/9/2022   PHQ-9 Total Score MyChart - 2 (Minimal depression)   PHQ-9 Total Score 4 2     GAD7:   VOLODYMYR-7 SCORE 3/26/2021 11/8/2022   Total Score - 19 (severe anxiety)   Total Score 9 19     CAGE-AID:   CAGE-AID Total Score 11/8/2022   Total Score 2   Total Score MyChart 2 (A total score of 2 or greater is considered clinically significant)     PROMIS 10-Global Health (all questions and answers displayed):   PROMIS 10 11/8/2022   In general, would you say your health is: Very good   In general, would you say your quality of life is: Very good   In general, how would you rate your physical health? Good   In general, how would you rate your mental health, including your mood and your ability to think? Good   In general, how would you rate your satisfaction with your social activities and relationships? Very good   In general, please rate how well you carry out your usual social activities and roles Very good   To what extent are you able to carry out your everyday physical activities such as walking, climbing stairs, carrying groceries, or moving a chair? Completely   How often have you been bothered by emotional problems such as feeling anxious, depressed or irritable? Often   How would you rate your fatigue on average? Mild   How would you rate your pain on average?   0 = No Pain  to  10 = Worst Imaginable Pain 0   In general, would you  say your health is: 4   In general, would you say your quality of life is: 4   In general, how would you rate your physical health? 3   In general, how would you rate your mental health, including your mood and your ability to think? 3   In general, how would you rate your satisfaction with your social activities and relationships? 4   In general, please rate how well you carry out your usual social activities and roles. (This includes activities at home, at work and in your community, and responsibilities as a parent, child, spouse, employee, friend, etc.) 4   To what extent are you able to carry out your everyday physical activities such as walking, climbing stairs, carrying groceries, or moving a chair? 5   In the past 7 days, how often have you been bothered by emotional problems such as feeling anxious, depressed, or irritable? 4   In the past 7 days, how would you rate your fatigue on average? 2   In the past 7 days, how would you rate your pain on average, where 0 means no pain, and 10 means worst imaginable pain? 0   Global Mental Health Score 13   Global Physical Health Score 17   PROMIS TOTAL - SUBSCORES 30   Some recent data might be hidden     Campus Suicide Severity Rating Scale (Lifetime/Recent)  Campus Suicide Severity Rating (Lifetime/Recent) 11/9/2022   1. Wish to be Dead (Lifetime) 0   2. Non-Specific Active Suicidal Thoughts (Lifetime) 0   Actual Attempt (Lifetime) 0   Has subject engaged in non-suicidal self-injurious behavior? (Lifetime) 0   Interrupted Attempts (Lifetime) 0   Aborted or Self-Interrupted Attempt (Lifetime) 0   Preparatory Acts or Behavior (Lifetime) 0   Calculated C-SSRS Risk Score (Lifetime/Recent) No Risk Indicated   Per patient: never had SI. No past attempts.     Safety Assessment:   Patient denies current homicidal ideation and behaviors.  Patient denies current self-injurious ideation and behaviors.    Patient denied risk behaviors associated with substance  use.  Patient denies any high risk behaviors associated with mental health symptoms.  Patient reports the following current concerns for their personal safety: None.  Patient reports there are firearms in the house.     yes, they are secured.     History of Safety Concerns:  Patient denied a history of homicidal ideation.     Patient denied a history of personal safety concerns.    Patient denied a history of assaultive behaviors.    Patient denied a history of sexual assault behaviors.     Patient denied a history of risk behaviors associated with substance use.  Patient denies any history of high risk behaviors associated with mental health symptoms.  Patient reports the following protective factors: forward or future oriented thinking; dedication to family or friends; safe and stable environment; regular sleep; effectively controls impulses; regular physical activity; sense of belonging; purpose; secure attachment; living with other people; daily obligations; structured day; effective problem solving skills; commitment to well being; sense of meaning; positive social skills; healthy fear of risky behaviors or pain; financial stability; strong sense of self worth or esteem; sense of personal control or determination    Risk Plan:  See Recommendations for Safety and Risk Management Plan    Review of Symptoms per patient report:  Depression: No symptoms  Lora:  No Symptoms  Psychosis: No Symptoms   Anxiety: Excessive worry, Nervousness, Physical complaints, such as headaches, stomachaches, muscle tension, Poor concentration and Irritability, patient states, feeling in chest, can sometimes shake it off, sometimes breathing heavy and trying to relax and calming down but takes longer. No sure if knowing this appointment was today but yesterday was rough for me. Few hours could not focus during work, trying to calm down. Patient states, it hits at different points, fine one day, and then next day get anxious for  whatever reason. Anger-Usually can keep temper, anxiety is making me have less patience and I do not want it to effect how I am raising my kids. Patient states, in raising them, in yelling at them and not yelling them. Dad has temper but was never abusive. Fear what if I do get mad and do something dumb. Getting in stupid rage that I can usually manage. Physical-Headaches increase in that. Patient states, introverted mind, always talking to myself/processing, sometimes that does effect my mood, talking self through situations.   Panic:  Palpitations, Tremors, Shortness of breath and chest tightness, patient states, start walking around pacing and walk it off to calm down.   Post Traumatic Stress Disorder:  No Symptoms   Eating Disorder: No Symptoms  ADD / ADHD:  No symptoms  Conduct Disorder: No symptoms  Autism Spectrum Disorder: No symptoms  Obsessive Compulsive Disorder: No Symptoms  Substance Use:  No symptoms     Patient reports the following compulsive behaviors and treatment history: none identified.      Diagnostic Criteria:   Unspecified Anxiety Disorder  Client reports the following symptoms of anxiety:   - Excessive anxiety and worry about a number of events or activities (such as work or school performance).    - The person finds it difficult to control the worry.   - Being easily fatigued.    - Difficulty concentrating or mind going blank.    - Irritability.    - Muscle tension.       Functional Status:  Patient reports the following functional impairments:  childcare / parenting, home life with family and social interactions.     Nonprogrammatic care:  Patient is requesting basic services to address current mental health concerns.    Clinical Summary:  1. Reason for assessment: seeking out recommendations.  2. Psychosocial, Cultural and Contextual Factors: Occupational stress and Covid Pandemic.  3. Principal DSM5 Diagnoses  (Sustained by DSM5 Criteria Listed Above):   300.00 (F41.9) Unspecified  Anxiety Disorder.  4. Other Diagnoses that is relevant to services:    5. Provisional Diagnosis:  6. Prognosis: Expect Improvement and Relieve Acute Symptoms.  7. Likely consequences of symptoms if not treated: Without treatment patient more than likely will experience a continuation of symptoms.  8. Client strengths include:  employed, insightful, motivated and work history .     Recommendations:     1. Plan for Safety and Risk Management:   Safety and Risk: Recommended that patient call 911 or go to the local ED should there be a change in any of these risk factors..          Report to child / adult protection services was NA.     2.Patient did not identify Lutheran, ethnic or cultural issues relevant to therapy at this time.Patient encouraged to ask for help should needs arise in the course of treatment.      3. Initial Treatment will focus on:    Anxiety.     4. Resources/Service Plan:    services are not indicated.   Modifications to assist communication are not indicated.   Additional disability accommodations are not indicated.      5. Collaboration:   Collaboration / coordination of treatment will be initiated with the following  support professionals: psychiatry.      6.  Referrals:   The following referral(s) will be initiated: Psychiatry  Brief outpatient individual therapy with [facility]. Patient declined this referral at this time. We discussed that he could eventually add on this service if he felt he needed more support but he could monitor his symptoms after working with the psychiatric provider and go from there.    Next Scheduled Appointment: PSYCHIATRIC (Medication) APPOINTMENT SCHEDULED  Date: Tuesday, 11/15/2022  Time: 10:00 am - 11:00 am  Provider: Morena Hardy MD, MD  Location: [facility], [address] [place] [address], [address], [place] [address]  Phone: [phone]  Type: Telepsychiatry  Patient Instructions: Please fill New Patient  Form by using following link. All forms need to be completed 48 hours prior to the appointment date/time by going to [email or web address]. Please call us at [phone] 72 hours prior to your scheduled appointment to confirm that you are able to attend. We will provide you information about how to log into video call software when you call..     A Release of Information has been obtained for the following: psychiatry.     Emergency Contact was obtained.  Wife, Nahomi Rodriguez, [phone]    7. BRENNAN:    BRENNAN:  Discussed the general effects of drugs and alcohol on health and well-being.  Recommendations:  No issues identified at this time.     8. Records:   These were reviewed at time of assessment.   Information in this assessment was obtained from the medical record and provided by patient who is a good historian.    Patient will have open access to their mental health medical record.    Clinical Substantiation  Summary: Patient would appear to benefit from working with a psychiatric provider to address his symptoms. Patient would also appear to benefit from brief individual outpatient therapy to process triggering psychosocial factors that can lead to symptoms. Patient reports he does not currently have any providers helping him address his mental health. He reports he does not have a history of working with providers. Patient reports he is able to function in his daily task and responsibilities. Patient did not report any safety concerns at this time.  (clinical summary that indicates how clinician came to the LOC recommendation - likely includes symptoms, diagnosis, and risk assessment)    Placement/Program Barriers Identified: Patient does work full time and has a family.     Referral: Psychiatry and brief therapy offered through [facility] or alternative long term therapy services.   (include LOC recommended and where patient is referred to/plan for patient, and alternative  placement option)       Provider Name/ Credentials:  Call[facility]bar[facility], [facility]  November 9, 2022

## 2022-11-09 NOTE — PATIENT INSTRUCTIONS
Cristobal,   It was a pleasure meeting with you today. We got you scheduled with a psychiatric provider, see appointment details below. If you have any questions or interest in setting up any other services please reach out.     PSYCHIATRIC (Medication) APPOINTMENT SCHEDULED  Date: Tuesday, 11/15/2022  Time: 10:00 am - 11:00 am  Provider: Morena Hardy MD, MD  Location: [facility], [address], [facility], [place] [address]  Phone: [phone]  Type: Telepsychiatry  Patient Instructions: Please fill New Patient Form by using following link. All forms need to be completed 48 hours prior to the appointment date/time by going to [email or web address]. Please call us at [phone] 72 hours prior to your scheduled appointment to confirm that you are able to attend. We will provide you information about how to log into video call software when you call.    RESOURCE  [facility] (free short term therapy)  Website: [email or web address]    Callie Corrigan LPCC, ANGELAC  Licensed Psychotherapist  [facility]  Mental Health and Addiction [facility] l [email or web address]  [email or web address]  Office: [phone]  Fax: [phone]  Gender pronouns: she/her/hers  Employed by: [employer]

## 2023-03-07 ASSESSMENT — ENCOUNTER SYMPTOMS
NERVOUS/ANXIOUS: 1
HEARTBURN: 0
FREQUENCY: 0
FEVER: 0
DIZZINESS: 0
PARESTHESIAS: 0
JOINT SWELLING: 0
SHORTNESS OF BREATH: 0
DIARRHEA: 0
ABDOMINAL PAIN: 0
HEMATOCHEZIA: 0
HEADACHES: 1
HEMATURIA: 0
CONSTIPATION: 0
MYALGIAS: 1
SORE THROAT: 0
COUGH: 0
WEAKNESS: 0
DYSURIA: 0
PALPITATIONS: 0
NAUSEA: 0
ARTHRALGIAS: 1
CHILLS: 0
EYE PAIN: 0

## 2023-03-08 ENCOUNTER — OFFICE VISIT (OUTPATIENT)
Dept: FAMILY MEDICINE | Facility: CLINIC | Age: 33
End: 2023-03-08
Payer: COMMERCIAL

## 2023-03-08 VITALS
WEIGHT: 195.6 LBS | BODY MASS INDEX: 28 KG/M2 | HEIGHT: 70 IN | DIASTOLIC BLOOD PRESSURE: 80 MMHG | HEART RATE: 70 BPM | RESPIRATION RATE: 16 BRPM | OXYGEN SATURATION: 97 % | TEMPERATURE: 98.5 F | SYSTOLIC BLOOD PRESSURE: 122 MMHG

## 2023-03-08 DIAGNOSIS — S41.009A: ICD-10-CM

## 2023-03-08 DIAGNOSIS — Z00.00 ROUTINE GENERAL MEDICAL EXAMINATION AT A HEALTH CARE FACILITY: Primary | ICD-10-CM

## 2023-03-08 DIAGNOSIS — L30.9 ECZEMA OF FACE: ICD-10-CM

## 2023-03-08 DIAGNOSIS — R53.83 FATIGUE, UNSPECIFIED TYPE: ICD-10-CM

## 2023-03-08 DIAGNOSIS — M25.50 MULTIPLE JOINT PAIN: ICD-10-CM

## 2023-03-08 DIAGNOSIS — F41.9 ANXIETY DISORDER, UNSPECIFIED TYPE: ICD-10-CM

## 2023-03-08 DIAGNOSIS — Z23 NEED FOR VACCINATION: ICD-10-CM

## 2023-03-08 DIAGNOSIS — E78.5 DYSLIPIDEMIA: ICD-10-CM

## 2023-03-08 DIAGNOSIS — Z13.220 ENCOUNTER FOR SCREENING FOR LIPID DISORDER: ICD-10-CM

## 2023-03-08 DIAGNOSIS — R52 GENERALIZED BODY ACHES: ICD-10-CM

## 2023-03-08 LAB
ALBUMIN SERPL BCG-MCNC: 4.8 G/DL (ref 3.5–5.2)
ALP SERPL-CCNC: 74 U/L (ref 40–129)
ALT SERPL W P-5'-P-CCNC: 41 U/L (ref 10–50)
ANION GAP SERPL CALCULATED.3IONS-SCNC: 10 MMOL/L (ref 7–15)
AST SERPL W P-5'-P-CCNC: 28 U/L (ref 10–50)
BILIRUB SERPL-MCNC: 1.1 MG/DL
BUN SERPL-MCNC: 13.5 MG/DL (ref 6–20)
CALCIUM SERPL-MCNC: 9.9 MG/DL (ref 8.6–10)
CHLORIDE SERPL-SCNC: 104 MMOL/L (ref 98–107)
CHOLEST SERPL-MCNC: 261 MG/DL
CREAT SERPL-MCNC: 1.11 MG/DL (ref 0.67–1.17)
DEPRECATED HCO3 PLAS-SCNC: 25 MMOL/L (ref 22–29)
ERYTHROCYTE [DISTWIDTH] IN BLOOD BY AUTOMATED COUNT: 12.9 % (ref 10–15)
GFR SERPL CREATININE-BSD FRML MDRD: 90 ML/MIN/1.73M2
GLUCOSE SERPL-MCNC: 87 MG/DL (ref 70–99)
HCT VFR BLD AUTO: 44.1 % (ref 40–53)
HDLC SERPL-MCNC: 56 MG/DL
HGB BLD-MCNC: 15.1 G/DL (ref 13.3–17.7)
LDLC SERPL CALC-MCNC: 182 MG/DL
MCH RBC QN AUTO: 30.8 PG (ref 26.5–33)
MCHC RBC AUTO-ENTMCNC: 34.2 G/DL (ref 31.5–36.5)
MCV RBC AUTO: 90 FL (ref 78–100)
NONHDLC SERPL-MCNC: 205 MG/DL
PLATELET # BLD AUTO: 283 10E3/UL (ref 150–450)
POTASSIUM SERPL-SCNC: 4.7 MMOL/L (ref 3.4–5.3)
PROT SERPL-MCNC: 7.3 G/DL (ref 6.4–8.3)
RBC # BLD AUTO: 4.9 10E6/UL (ref 4.4–5.9)
RHEUMATOID FACT SER NEPH-ACNC: <7 IU/ML
SODIUM SERPL-SCNC: 139 MMOL/L (ref 136–145)
TRIGL SERPL-MCNC: 117 MG/DL
TSH SERPL DL<=0.005 MIU/L-ACNC: 1.31 UIU/ML (ref 0.3–4.2)
WBC # BLD AUTO: 4.6 10E3/UL (ref 4–11)

## 2023-03-08 PROCEDURE — 90471 IMMUNIZATION ADMIN: CPT | Performed by: INTERNAL MEDICINE

## 2023-03-08 PROCEDURE — 84443 ASSAY THYROID STIM HORMONE: CPT | Performed by: INTERNAL MEDICINE

## 2023-03-08 PROCEDURE — 36415 COLL VENOUS BLD VENIPUNCTURE: CPT | Performed by: INTERNAL MEDICINE

## 2023-03-08 PROCEDURE — 80061 LIPID PANEL: CPT | Performed by: INTERNAL MEDICINE

## 2023-03-08 PROCEDURE — 99395 PREV VISIT EST AGE 18-39: CPT | Mod: 25 | Performed by: INTERNAL MEDICINE

## 2023-03-08 PROCEDURE — 90746 HEPB VACCINE 3 DOSE ADULT IM: CPT | Performed by: INTERNAL MEDICINE

## 2023-03-08 PROCEDURE — 99215 OFFICE O/P EST HI 40 MIN: CPT | Mod: 25 | Performed by: INTERNAL MEDICINE

## 2023-03-08 PROCEDURE — 85027 COMPLETE CBC AUTOMATED: CPT | Performed by: INTERNAL MEDICINE

## 2023-03-08 PROCEDURE — 80053 COMPREHEN METABOLIC PANEL: CPT | Performed by: INTERNAL MEDICINE

## 2023-03-08 PROCEDURE — 86038 ANTINUCLEAR ANTIBODIES: CPT | Performed by: INTERNAL MEDICINE

## 2023-03-08 PROCEDURE — 84403 ASSAY OF TOTAL TESTOSTERONE: CPT | Performed by: INTERNAL MEDICINE

## 2023-03-08 PROCEDURE — 86200 CCP ANTIBODY: CPT | Performed by: INTERNAL MEDICINE

## 2023-03-08 PROCEDURE — 86431 RHEUMATOID FACTOR QUANT: CPT | Performed by: INTERNAL MEDICINE

## 2023-03-08 RX ORDER — TRIAMCINOLONE ACETONIDE 1 MG/G
CREAM TOPICAL
Qty: 30 G | Refills: 0 | Status: SHIPPED | OUTPATIENT
Start: 2023-03-08

## 2023-03-08 RX ORDER — HYDROXYZINE PAMOATE 25 MG/1
CAPSULE ORAL
COMMUNITY
Start: 2023-01-11

## 2023-03-08 RX ORDER — CITALOPRAM HYDROBROMIDE 10 MG/1
10 TABLET ORAL
Qty: 90 TABLET | Refills: 1 | Status: SHIPPED | OUTPATIENT
Start: 2023-03-08 | End: 2023-09-07

## 2023-03-08 RX ORDER — CITALOPRAM HYDROBROMIDE 10 MG/1
1 TABLET ORAL
COMMUNITY
Start: 2023-02-14 | End: 2023-03-08

## 2023-03-08 ASSESSMENT — ENCOUNTER SYMPTOMS
CONSTIPATION: 0
DYSURIA: 0
EYE PAIN: 0
SORE THROAT: 0
NERVOUS/ANXIOUS: 1
FREQUENCY: 0
DIZZINESS: 0
HEMATURIA: 0
COUGH: 0
NAUSEA: 0
DIARRHEA: 0
ABDOMINAL PAIN: 0
SHORTNESS OF BREATH: 0
MYALGIAS: 1
PALPITATIONS: 0
CHILLS: 0
PARESTHESIAS: 0
HEARTBURN: 0
ARTHRALGIAS: 1
WEAKNESS: 0
FEVER: 0
JOINT SWELLING: 0
HEMATOCHEZIA: 0
HEADACHES: 1

## 2023-03-08 ASSESSMENT — PAIN SCALES - GENERAL: PAINLEVEL: NO PAIN (0)

## 2023-03-08 NOTE — PATIENT INSTRUCTIONS
As discussed , please do fasting labs ordered.     Will check connective tissue panel for your joint pains and aches. Added testosterone levels.   Also vitamin D deficiency.     Medication for eczema rash is sent to pharmacy.     ==============================    Preventive Health Recommendations  Male Ages 26 - 39    Yearly exam:             See your health care provider every year in order to  o   Review health changes.   o   Discuss preventive care.    o   Review your medicines if your doctor has prescribed any.  You should be tested each year for STDs (sexually transmitted diseases), if you re at risk.   After age 35, talk to your provider about cholesterol testing. If you are at risk for heart disease, have your cholesterol tested at least every 5 years.   If you are at risk for diabetes, you should have a diabetes test (fasting glucose).  Shots: Get a flu shot each year. Get a tetanus shot every 10 years.     Nutrition:  Eat at least 5 servings of fruits and vegetables daily.   Eat whole-grain bread, whole-wheat pasta and brown rice instead of white grains and rice.   Get adequate Calcium and Vitamin D.     Lifestyle  Exercise for at least 150 minutes a week (30 minutes a day, 5 days a week). This will help you control your weight and prevent disease.   Limit alcohol to one drink per day.   No smoking.   Wear sunscreen to prevent skin cancer.   See your dentist every six months for an exam and cleaning.

## 2023-03-08 NOTE — PROGRESS NOTES
"SUBJECTIVE:   CC: Cristobal is an 32 year old who presents for preventative health visit.     Patient has been advised of split billing requirements and indicates understanding: Yes  Healthy Habits:     Getting at least 3 servings of Calcium per day:  Yes    Bi-annual eye exam:  Yes    Dental care twice a year:  NO    Sleep apnea or symptoms of sleep apnea:  Daytime drowsiness    Diet:  Regular (no restrictions) and Breakfast skipped    Frequency of exercise:  2-3 days/week    Duration of exercise:  30-45 minutes    Taking medications regularly:  Yes    Medication side effects:  None    Additional concerns today:  No      Today's PHQ-2 Score:       3/7/2023     8:59 PM   PHQ-2 ( 1999 [employer])   Q1: Little interest or pleasure in doing things 0   Q2: Feeling down, depressed or hopeless 0   PHQ-2 Score 0   Q1: Little interest or pleasure in doing things Not at all   Q2: Feeling down, depressed or hopeless Not at all   PHQ-2 Score 0       Have you ever done Advance Care Planning? (For example, a Health Directive, POLST, or a discussion with a medical provider or your loved ones about your wishes): No, advance care planning information given to patient to review.  Patient declined advance care planning discussion at this time.    Social History     Tobacco Use    Smoking status: Never    Smokeless tobacco: Never   Substance Use Topics    Alcohol use: Yes     Comment: once weekly             3/7/2023     8:58 PM   Alcohol Use   Prescreen: >3 drinks/day or >7 drinks/week? No          No data to display                Last PSA: No results found for: \"PSA\"    Reviewed orders with patient. Reviewed health maintenance and updated orders accordingly - Yes  Lab work is in process  Labs reviewed in EPIC    Reviewed and updated as needed this visit by clinical staff   Tobacco  Allergies  Meds  Problems  Med Hx  Surg Hx  Fam Hx          Reviewed and updated as needed this visit by Provider   Tobacco  Allergies   Problems  Med " Hx  Surg Hx  Fam Hx         Past Medical History:   Diagnosis Date    NO ACTIVE PROBLEMS       Past Surgical History:   Procedure Laterality Date    ARTHROTOMY SHOULDER, ROTATOR CUFF REPAIR, COMBINED  4/26/2013    Procedure: COMBINED ARTHROTOMY SHOULDER, ROTATOR CUFF REPAIR;  Left Open Shoulder Capsular Shift and Labral Repair;  Surgeon: Ras Caraballo MD;  Location: [facility] OR    FRACTURE TX, FINGER/HAND      No surgery just casting.    LASIK      bilateral       Review of Systems   Constitutional:  Negative for chills and fever.   HENT:  Negative for congestion, ear pain, hearing loss and sore throat.    Eyes:  Negative for pain and visual disturbance.   Respiratory:  Negative for cough and shortness of breath.    Cardiovascular:  Negative for chest pain, palpitations and peripheral edema.   Gastrointestinal:  Negative for abdominal pain, constipation, diarrhea, heartburn, hematochezia and nausea.   Genitourinary:  Negative for dysuria, frequency, genital sores, hematuria, impotence, penile discharge and urgency.   Musculoskeletal:  Positive for arthralgias and myalgias. Negative for joint swelling.   Skin:  Negative for rash.   Neurological:  Positive for headaches. Negative for dizziness, weakness and paresthesias.   Psychiatric/Behavioral:  Negative for mood changes. The patient is nervous/anxious.      CONSTITUTIONAL: NEGATIVE for fever, chills, change in weight  INTEGUMENTARY/SKIN: NEGATIVE for worrisome rashes, moles or lesions  EYES: NEGATIVE for vision changes or irritation  ENT: NEGATIVE for ear, mouth and throat problems  RESP: NEGATIVE for significant cough or SOB  CV: NEGATIVE for chest pain, palpitations or peripheral edema  GI: NEGATIVE for nausea, abdominal pain, heartburn, or change in bowel habits   male: negative for dysuria, hematuria, decreased urinary stream, erectile dysfunction, urethral discharge  MUSCULOSKELETAL: NEGATIVE for significant arthralgias or myalgia  NEURO: NEGATIVE for  "weakness, dizziness or paresthesias  PSYCHIATRIC: NEGATIVE for changes in mood or affect    OBJECTIVE:   /80   Pulse 70   Temp 98.5  F (36.9  C) (Temporal)   Resp 16   Ht 1.778 m (5' 10\")   Wt 88.7 kg (195 lb 9.6 oz)   SpO2 97%   BMI 28.07 kg/m      Physical Exam  GENERAL: healthy, alert and no distress  EYES: Eyes grossly normal to inspection, PERRL and conjunctivae and sclerae normal  HENT: ear canals and TM's normal, nose and mouth without ulcers or lesions  NECK: no adenopathy, no asymmetry, masses, or scars and thyroid normal to palpation  RESP: lungs clear to auscultation - no rales, rhonchi or wheezes  CV: regular rate and rhythm, normal S1 S2, no S3 or S4, no murmur, click or rub, no peripheral edema and peripheral pulses strong  ABDOMEN: soft, nontender, no hepatosplenomegaly, no masses and bowel sounds normal  MS: no gross musculoskeletal defects noted, no edema  SKIN: POSITIVE for eczematous malar rash on the face bilaterally.    NEURO: Normal strength and tone, mentation intact and speech normal  PSYCH: mentation appears normal, affect normal/bright    Diagnostic Test Results:  Labs reviewed in Epic    ASSESSMENT/PLAN:       Assessment and Plan  1. Routine general medical examination at a health care facility  Patient is new to me, last seen [facility] for pharyngitis.  He is here for annual physical.  - REVIEW OF HEALTH MAINTENANCE PROTOCOL ORDERS  - Comprehensive metabolic panel (BMP + Alb, Alk Phos, ALT, AST, Total. Bili, TP); Future  - TSH with free T4 reflex; Future  - CBC with platelets; Future  - Lipid panel reflex to direct LDL Fasting; Future    2. Open division of ligament of shoulder  Past history of trauma in athletics with the repair of ligament on the left shoulder.  Denies any new concerns at this time.    3. Need for vaccination  - HEPATITIS B VACCINE, ADULT, IM    4. Anxiety disorder, unspecified type  Chronic problem, well-controlled.  PHQ-9 in the office today is " normal.  We will refill the current Celexa 10 mg daily which patient has been tolerating well since December 2022.  We will check thyroid function given his ongoing symptoms as below.  - TSH with free T4 reflex; Future  - citalopram (CELEXA) 10 MG tablet; Take 1 tablet (10 mg) by mouth daily at 2 pm  Dispense: 90 tablet; Refill: 1    5. Encounter for screening for lipid disorder  - Lipid panel reflex to direct LDL Fasting; Future    6. Eczema of face  New problem, patient states that he has been experiencing this since past few months.  Denies any symptoms of contact dermatitis or allergies as he does not wear a mask anytime outside the clinic.  We will check the below connective tissue panel along with empiric trial of medium intensity steroid for possible eczematous rash as it appears clinically.  Patient understood and agreed with the plan to use it at least for next 2 weeks and give a holiday and moisturize.  Will await for the lab results below and update on further association with any connective tissue disease.  - triamcinolone (KENALOG) 0.1 % external cream; Apply sparingly to affected area tid prn.  Dispense: 30 g; Refill: 0    7. Generalized body aches  8. Multiple joint pain  New problem, as discussed patient states that he has family history of fibromyalgia which I discussed most part of the appointment on how it is diagnosed as a diagnosis of exclusion if all the other connective tissue panel is negative.  Given the presenting symptoms of multiple joint pains including hand joints will check rheumatoid factor and CCP, malar rash we will make sure there is no lupus.  Further recommendations after the lab reports.  - Rheumatoid factor; Future  - Cyclic Citrullinated Peptide Antibody IgG; Future  - Anti Nuclear Janee IgG by IFA with Reflex; Future    9. Fatigue, unspecified type  Ongoing problem, uncontrolled.  Will check for electrolytes and blood counts.  Added thyroid function and testosterone levels to  exclude these metabolic causes which could be causing the fatigue.  Patient denies any depression underlying and states he is well controlled with current Celexa.  - Comprehensive metabolic panel (BMP + Alb, Alk Phos, ALT, AST, Total. Bili, TP); Future  - TSH with free T4 reflex; Future  - Testosterone total; Future    10. Dyslipidemia  Uncontrolled, started on Simvastatin for improvement.  - simvastatin (ZOCOR) 10 MG tablet; Take 1 tablet (10 mg) by mouth At Bedtime  Dispense: 90 tablet; Refill: 1       Over 40 minutes spent outside the preventive visit ( 20 minutes ) on reviewing patient chart,  face to face encounter, greater than 50% time spent with plan/cordination of care and documentation as above in my A/P.           Patient Instructions   As discussed , please do fasting labs ordered.     Will check connective tissue panel for your joint pains and aches. Added testosterone levels.   Also vitamin D deficiency.     Medication for eczema rash is sent to pharmacy.     ==============================    Preventive Health Recommendations  Male Ages 26 - 39    Yearly exam:             See your health care provider every year in order to  o   Review health changes.   o   Discuss preventive care.    o   Review your medicines if your doctor has prescribed any.  You should be tested each year for STDs (sexually transmitted diseases), if you re at risk.   After age 35, talk to your provider about cholesterol testing. If you are at risk for heart disease, have your cholesterol tested at least every 5 years.   If you are at risk for diabetes, you should have a diabetes test (fasting glucose).  Shots: Get a flu shot each year. Get a tetanus shot every 10 years.     Nutrition:  Eat at least 5 servings of fruits and vegetables daily.   Eat whole-grain bread, whole-wheat pasta and brown rice instead of white grains and rice.   Get adequate Calcium and Vitamin D.     Lifestyle  Exercise for at least 150 minutes a week (30  "minutes a day, 5 days a week). This will help you control your weight and prevent disease.   Limit alcohol to one drink per day.   No smoking.   Wear sunscreen to prevent skin cancer.   See your dentist every six months for an exam and cleaning.     Return in about 6 months (around 9/8/2023), or if symptoms worsen or fail to improve, for E-Visit, depression.    MD [facility]NA [facility]arekh [facility]      Patient has been advised of split billing requirements and indicates understanding: Yes      COUNSELING:   Reviewed preventive health counseling, as reflected in patient instructions  Special attention given to:        Regular exercise       Healthy diet/nutrition       Vision screening       Immunizations  Vaccinated for: Hepatitis B and Declined: Covid-19 and Influenza due to Concerns about side effects/safety               Alcohol Use       BMI:   Estimated body mass index is 28.07 kg/m  as calculated from the following:    Height as of this encounter: 1.778 m (5' 10\").    Weight as of this encounter: 88.7 kg (195 lb 9.6 oz).   Weight management plan: Discussed healthy diet and exercise guidelines      He reports that he has never smoked. He has never used smokeless tobacco.        MD JUSTYNA Parekh [facility] [place]  "

## 2023-03-09 LAB
ANA SER QL IF: NEGATIVE
CCP AB SER IA-ACNC: 0.5 U/ML
TESTOST SERPL-MCNC: 492 NG/DL (ref 240–950)

## 2023-03-12 RX ORDER — SIMVASTATIN 10 MG
10 TABLET ORAL AT BEDTIME
Qty: 90 TABLET | Refills: 1 | Status: SHIPPED | OUTPATIENT
Start: 2023-03-12 | End: 2023-09-19

## 2023-05-06 ENCOUNTER — E-VISIT (OUTPATIENT)
Dept: FAMILY MEDICINE | Facility: CLINIC | Age: 33
End: 2023-05-06
Payer: COMMERCIAL

## 2023-05-06 DIAGNOSIS — J45.990 EXERCISE-INDUCED ASTHMA: Primary | ICD-10-CM

## 2023-05-06 PROCEDURE — 99422 OL DIG E/M SVC 11-20 MIN: CPT | Performed by: INTERNAL MEDICINE

## 2023-05-06 RX ORDER — ALBUTEROL SULFATE 90 UG/1
2 AEROSOL, METERED RESPIRATORY (INHALATION) EVERY 6 HOURS
Qty: 18 G | Refills: 1 | Status: SHIPPED | OUTPATIENT
Start: 2023-05-06 | End: 2023-06-20

## 2023-05-06 ASSESSMENT — ASTHMA QUESTIONNAIRES: ACT_TOTALSCORE: 22

## 2023-05-06 NOTE — TELEPHONE ENCOUNTER
Provider E-Visit time total (minutes): 11 minutes    Jhoan Jain ,     Sorry to hear that. Yes, its commonly called as ' Exercise induced Asthma' . I went ahead and sent in Albuterol ( Pocket / Rescue ) inhaler as needed which you can use every 4 hrs as needed for symptoms.     Please let me know if you have any questions.    Thank you,  Hiral Reyes MD on 5/6/2023 at 2:29 PM

## 2023-05-06 NOTE — PATIENT INSTRUCTIONS
Thank you for choosing us for your care. I have placed an order for a prescription so that you can start treatment. View your full visit summary for details by clicking on the link below. Your pharmacist will able to address any questions you may have about the medication.     If you're not feeling better within 5-7 days, please schedule an appointment.  You can schedule an appointment right here in [facility], or call [phone]  If the visit is for the same symptoms as your eVisit, we'll refund the cost of your eVisit if seen within seven days.

## 2023-06-20 DIAGNOSIS — J45.990 EXERCISE-INDUCED ASTHMA: ICD-10-CM

## 2023-06-20 RX ORDER — ALBUTEROL SULFATE 90 UG/1
2 AEROSOL, METERED RESPIRATORY (INHALATION) EVERY 6 HOURS
Qty: 18 G | Refills: 2 | Status: SHIPPED | OUTPATIENT
Start: 2023-06-20

## 2023-06-20 NOTE — TELEPHONE ENCOUNTER
Prescription approved per [facility] Refill Protocol.  Adrienne Alejandra, RN  [facility] Triage Nurse

## 2023-09-07 DIAGNOSIS — F41.9 ANXIETY DISORDER, UNSPECIFIED TYPE: ICD-10-CM

## 2023-09-08 RX ORDER — CITALOPRAM HYDROBROMIDE 10 MG/1
10 TABLET ORAL
Qty: 90 TABLET | Refills: 0 | Status: SHIPPED | OUTPATIENT
Start: 2023-09-08

## 2023-09-19 ENCOUNTER — ANCILLARY PROCEDURE (OUTPATIENT)
Dept: GENERAL RADIOLOGY | Facility: CLINIC | Age: 33
End: 2023-09-19
Attending: NURSE PRACTITIONER
Payer: COMMERCIAL

## 2023-09-19 ENCOUNTER — OFFICE VISIT (OUTPATIENT)
Dept: FAMILY MEDICINE | Facility: CLINIC | Age: 33
End: 2023-09-19
Payer: COMMERCIAL

## 2023-09-19 VITALS
WEIGHT: 202 LBS | RESPIRATION RATE: 18 BRPM | DIASTOLIC BLOOD PRESSURE: 86 MMHG | TEMPERATURE: 97.4 F | HEIGHT: 70 IN | BODY MASS INDEX: 28.92 KG/M2 | OXYGEN SATURATION: 98 % | HEART RATE: 77 BPM | SYSTOLIC BLOOD PRESSURE: 124 MMHG

## 2023-09-19 DIAGNOSIS — M25.552 HIP PAIN, LEFT: Primary | ICD-10-CM

## 2023-09-19 DIAGNOSIS — M25.852 FEMOROACETABULAR IMPINGEMENT OF LEFT HIP: ICD-10-CM

## 2023-09-19 DIAGNOSIS — E78.5 DYSLIPIDEMIA: ICD-10-CM

## 2023-09-19 DIAGNOSIS — M25.552 HIP PAIN, LEFT: ICD-10-CM

## 2023-09-19 PROCEDURE — 73502 X-RAY EXAM HIP UNI 2-3 VIEWS: CPT | Mod: TC | Performed by: RADIOLOGY

## 2023-09-19 PROCEDURE — 99213 OFFICE O/P EST LOW 20 MIN: CPT | Performed by: NURSE PRACTITIONER

## 2023-09-19 RX ORDER — SIMVASTATIN 10 MG
10 TABLET ORAL AT BEDTIME
Qty: 90 TABLET | Refills: 3 | Status: SHIPPED | OUTPATIENT
Start: 2023-09-19 | End: 2024-09-23

## 2023-09-19 RX ORDER — NAPROXEN 500 MG/1
500 TABLET ORAL 2 TIMES DAILY WITH MEALS
Qty: 45 TABLET | Refills: 0 | Status: SHIPPED | OUTPATIENT
Start: 2023-09-19 | End: 2023-10-03

## 2023-09-19 NOTE — PROGRESS NOTES
"  Assessment & Plan       Cristobal was seen today for musculoskeletal problem.    Diagnoses and all orders for this visit:    Hip pain, left  -     XR Hip Left 2-3 Views; Future  IMPRESSION:  1.  Normal left hip joint spacing and alignment.  2.  Left proximal femur cam morphology. This can be seen in the  setting of femoroacetabular impingement.  -     Orthopedic  Referral; Future  -     naproxen (NAPROSYN) 500 MG tablet; Take 1 tablet (500 mg) by mouth 2 times daily (with meals)    Dyslipidemia  Refill completed, restart medication.    -     simvastatin (ZOCOR) 10 MG tablet; Take 1 tablet (10 mg) by mouth At Bedtime           BMI:   Estimated body mass index is 28.98 kg/m  as calculated from the following:    Height as of this encounter: 1.778 m (5' 10\").    Weight as of this encounter: 91.6 kg (202 lb).     Return in about 2 weeks (around 10/3/2023) for consultation with orthopedics.    Anna Marie Padron, MACARENA CNP  [facility]   Cristobal is a 33 year old, presenting for the following health issues:  Musculoskeletal Problem        9/19/2023    11:13 AM   Additional Questions   Roomed by Yesika CROCKER       History of Present Illness       Reason for visit:  Hip pain on left side.  Symptom onset:  More than a month  Symptoms include:  General pain at rest and during activity.  Symptom intensity:  Moderate  Symptom progression:  Worsening  Had these symptoms before:  No  What makes it worse:  Laying on left side or activity.  What makes it better:  Not really.    He eats 2-3 servings of fruits and vegetables daily.He consumes 0 sweetened beverage(s) daily.He exercises with enough effort to increase his heart rate 30 to 60 minutes per day.  He exercises with enough effort to increase his heart rate 6 days per week.   He is taking medications regularly.     Has played hockey his whole life.    Ran into boards weird 1 year ago and feels like left hip pain started at that point.  " "  Ache at rest and increase in pain with pressure on left side.  Aggravated with sleeping on left side, prolonged sitting and exercise.    Doesn't take Ibuprofen or Tylenol.    Stretching does help to alleviate pain.    No significant pain with ambulation.    Recently had 3rd child.  She is 3 weeks old and he is currently off on paternity leave.          Only took Simvastatin for a few months, didn't have refills.    Recent Labs   Lab Test 03/08/23  0901 03/26/21  0921   CHOL 261* 213*   HDL 56 52   * 151*   TRIG 117 49           Review of Systems   Constitutional, HEENT, cardiovascular, pulmonary, gi and gu systems are negative, except as otherwise noted.      Objective    /86   Pulse 77   Temp 97.4  F (36.3  C)   Resp 18   Ht 1.778 m (5' 10\")   Wt 91.6 kg (202 lb)   SpO2 98%   BMI 28.98 kg/m    Body mass index is 28.98 kg/m .    Physical Exam     GENERAL: healthy, alert and no distress  MS: left hip with full ROM, pain elicited with internal rotation and adduction  PSYCH: mentation appears normal, affect normal/bright              "

## 2023-09-19 NOTE — RESULT ENCOUNTER NOTE
Dear Cristobal,     The radiologist read your x-ray and noted normal joint spacing and alignment, but did show the abnormality in your femur which is indicative of femoroacetabular impingement.    Femoroacetabular impingement syndrome (FAIS) describes hip-related groin pain due to pathological contact between the femoral head-neck junction and the acetabular rim during a functional range of hip movement.    I recommend further consultation with orthopedics as previously discussed.     Thank you,     Anna Marie Padron, APRN, CNP  [facility]

## 2023-09-26 ENCOUNTER — OFFICE VISIT (OUTPATIENT)
Dept: ORTHOPEDICS | Facility: CLINIC | Age: 33
End: 2023-09-26
Attending: NURSE PRACTITIONER
Payer: COMMERCIAL

## 2023-09-26 VITALS — WEIGHT: 202 LBS | BODY MASS INDEX: 28.98 KG/M2 | DIASTOLIC BLOOD PRESSURE: 86 MMHG | SYSTOLIC BLOOD PRESSURE: 122 MMHG

## 2023-09-26 DIAGNOSIS — M25.552 HIP PAIN, LEFT: ICD-10-CM

## 2023-09-26 DIAGNOSIS — M25.859 FEMOROACETABULAR IMPINGEMENT: Primary | ICD-10-CM

## 2023-09-26 PROCEDURE — 20611 DRAIN/INJ JOINT/BURSA W/US: CPT | Mod: LT | Performed by: STUDENT IN AN ORGANIZED HEALTH CARE EDUCATION/TRAINING PROGRAM

## 2023-09-26 PROCEDURE — 99214 OFFICE O/P EST MOD 30 MIN: CPT | Mod: 25 | Performed by: STUDENT IN AN ORGANIZED HEALTH CARE EDUCATION/TRAINING PROGRAM

## 2023-09-26 RX ORDER — TRIAMCINOLONE ACETONIDE 40 MG/ML
40 INJECTION, SUSPENSION INTRA-ARTICULAR; INTRAMUSCULAR
Status: SHIPPED | OUTPATIENT
Start: 2023-09-26

## 2023-09-26 RX ORDER — LIDOCAINE HYDROCHLORIDE 10 MG/ML
3 INJECTION, SOLUTION INFILTRATION; PERINEURAL
Status: SHIPPED | OUTPATIENT
Start: 2023-09-26

## 2023-09-26 RX ORDER — ROPIVACAINE HYDROCHLORIDE 5 MG/ML
4 INJECTION, SOLUTION EPIDURAL; INFILTRATION; PERINEURAL
Status: SHIPPED | OUTPATIENT
Start: 2023-09-26

## 2023-09-26 RX ADMIN — LIDOCAINE HYDROCHLORIDE 3 ML: 10 INJECTION, SOLUTION INFILTRATION; PERINEURAL at 10:34

## 2023-09-26 RX ADMIN — TRIAMCINOLONE ACETONIDE 40 MG: 40 INJECTION, SUSPENSION INTRA-ARTICULAR; INTRAMUSCULAR at 10:34

## 2023-09-26 RX ADMIN — ROPIVACAINE HYDROCHLORIDE 4 ML: 5 INJECTION, SOLUTION EPIDURAL; INFILTRATION; PERINEURAL at 10:34

## 2023-09-26 ASSESSMENT — PAIN SCALES - GENERAL: PAINLEVEL: NO PAIN (0)

## 2023-09-26 NOTE — PATIENT INSTRUCTIONS
Post-Injection Discharge Instructions    You may shower, however avoid swimming, tub baths or hot tubs for 24 hours following your procedure  You may have a mild to moderate increase in pain for a few days following the injection.  The lidocaine (local numbing medicine) will wear off in several hours. It usually takes 3-5 days for the steroid medication to start working although it may take up to 14 days for full effect.   You may use ice packs for 10-15 minutes, 3 to 4 times a day at the injection site for comfort if needed  You may use extra strength Tylenol for pain control if necessary   If you were fasting, you may resume your normal diet and medications after the procedure  If you have diabetes, your blood sugar may be higher than normal for 10-14 days following a steroid injection. Contact your doctor who manages your diabetes if your blood sugar is significantly higher than usual    If you experience any of the following, call Sports Medicine @ [phone] or [phone]  -Fever over 100 degrees F  -Swelling, bleeding, redness, drainage, warmth at the injection site  -New or significant worsening pain

## 2023-09-26 NOTE — PROGRESS NOTES
ASSESSMENT & PLAN    Cristobal was seen today for pain.    Diagnoses and all orders for this visit:    Femoroacetabular impingement  -     Physical Therapy Referral; Future    Hip pain, left  -     Orthopedic  Referral    Other orders  -     Large Joint Injection: L hip joint      33-year-old male presents with left hip pain for approximately 1 year.  He does have a cam deformity seen on x-ray consistent with femoral acetabular impingement, and on physical exam his pain is provoked with FADIR suggesting that NELIA may be the primary cause of his symptoms.  Discussed with patient that he may also have some labral pathology but initial treatment for both of these conditions is the same.    Plan:  - Ultrasound-guided intra-articular corticosteroid injection performed to the left hip today in clinic  - Start physical therapy and home exercise program  - Discussed that if above interventions did not help with his pain, we would consider further imaging at that time and possible referral to orthopedic surgery  -Can continue take over-the-counter Tylenol or NSAIDs as needed for pain      Return in about 8 weeks (around 11/21/2023).    Large Joint Injection: L hip joint    Date/Time: 9/26/2023 10:34 AM    Performed by: Joan Holden DO  Authorized by: Joan Holden DO    Indications:  Pain  Needle Size:  25 G  Guidance: ultrasound    Approach:  Anterior  Location:  Hip      Site:  L hip joint  Medications:  40 mg triamcinolone 40 MG/ML; 4 mL ROPivacaine 5 MG/ML; 3 mL lidocaine 1 %  Outcome:  Tolerated well, no immediate complications  Procedure discussed: discussed risks, benefits, and alternatives    Consent Given by:  Patient  Timeout: timeout called immediately prior to procedure    Prep: patient was prepped and draped in usual sterile fashion     Ultrasound was used to ensure safe and accurate needle placement and injection. Ultrasound images of the procedure were permanently stored.  1ml of 8.4%  "Sodium Bicarbonate solution was used to buffer the local numbing agent for today's injection        Dr. Joan Holden, DO  [facility]  Sports Medicine     -----  Chief Complaint   Patient presents with    Left Hip - Pain     Chronic pain of the left hip       SUBJECTIVE  Cristobal Rodriguez Jr. is a/an 33 year old male who is seen in consultation at the request of  Anna Marie Padron C.N.P. for evaluation of left hip pain. Pain worse with riding bike, pain is primarily anterolateral and feels sharp/pinching. Achy at rest. Sit to stand will cause sharp pain.     The patient is seen alone    Onset: 1 year ago. Reports insidious onset without acute precipitating event. Pain is gradually worsening  Location/characterization of Pain: left anterior hip, some lateral pain as well.    Worsened by: activity, laying on the left side, sitting, biking. Some pain at rest  Treatments tried:  stretching  Associated symptoms:  throbbing/aching when sitting , sharp pain when laying on it    Prior injury/Surgical history of affected joint: YES - Date: \"fell into the boards during hockey\" 1 year or longer ago.   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     REVIEW OF SYSTEMS:  Pertinent positives/negative: As stated above in HPI    OBJECTIVE:  /86   Wt 91.6 kg (202 lb)   BMI 28.98 kg/m     General: Alert and in no distress  Skin: no visable rashes  CV: Extremities appear well perfused   Resp: normal respiratory effort, no conversational dyspnea   Psych: normal mood, affect  Gait: NORMAL  MSK:  LEFT Hip Exam  Inspection:    No swelling, bruising, discoloration, or obvious deformity   Palpation:    Tenderness to palpation of hip flexor tendons  Active Range of Motion:     Flexion  full / IR full / ER  full  Strength:    Flexion 5/5 / adduction 5/5 / abduction 5/5  Special Tests:    Positive: anterior impingement (FADIR), pain relieved with ALFONSO   "     RADIOLOGY:  Final results and radiologist's interpretation available in the [facility] health record.  Images below were personally reviewed and discussed with the patient in the office today.  My personal interpretation of the performed imaging: X-ray of the left hip reveals cam deformity of the proximal femur suggestive of femoral acetabular impingement      Review of prior external note(s) from - family medicine

## 2023-09-26 NOTE — LETTER
9/26/2023         RE: Cristobal Rodriguez Jr.  [address] [facility] [address]        Dear Colleague,    Thank you for referring your patient, Cristobal Rodriguez Jr., to the [facility] [place]. Please see a copy of my visit note below.    ASSESSMENT & PLAN    Cristobal was seen today for pain.    Diagnoses and all orders for this visit:    Femoroacetabular impingement  -     Physical Therapy Referral; Future    Hip pain, left  -     Orthopedic  Referral    Other orders  -     Large Joint Injection: L hip joint      33-year-old male presents with left hip pain for approximately 1 year.  He does have a cam deformity seen on x-ray consistent with femoral acetabular impingement, and on physical exam his pain is provoked with FADIR suggesting that NELIA may be the primary cause of his symptoms.  Discussed with patient that he may also have some labral pathology but initial treatment for both of these conditions is the same.    Plan:  - Ultrasound-guided intra-articular corticosteroid injection performed to the left hip today in clinic  - Start physical therapy and home exercise program  - Discussed that if above interventions did not help with his pain, we would consider further imaging at that time and possible referral to orthopedic surgery  -Can continue take over-the-counter Tylenol or NSAIDs as needed for pain      Return in about 8 weeks (around 11/21/2023).    Large Joint Injection: L hip joint    Date/Time: 9/26/2023 10:34 AM    Performed by: Joan Holden DO  Authorized by: Joan Holden DO    Indications:  Pain  Needle Size:  25 G  Guidance: ultrasound    Approach:  Anterior  Location:  Hip      Site:  L hip joint  Medications:  40 mg triamcinolone 40 MG/ML; 4 mL ROPivacaine 5 MG/ML; 3 mL lidocaine 1 %  Outcome:  Tolerated well, no immediate complications  Procedure discussed: discussed risks, benefits, and alternatives    Consent Given by:  Patient  Timeout:  "timeout called immediately prior to procedure    Prep: patient was prepped and draped in usual sterile fashion     Ultrasound was used to ensure safe and accurate needle placement and injection. Ultrasound images of the procedure were permanently stored.  1ml of 8.4% Sodium Bicarbonate solution was used to buffer the local numbing agent for today's injection        Dr. Joan Holden,   [facility]  Sports Medicine     -----  Chief Complaint   Patient presents with     Left Hip - Pain     Chronic pain of the left hip       SUBJECTIVE  Cristobal Rodriguez Jr. is a/an 33 year old male who is seen in consultation at the request of  Anna Marie Padron C.N.P. for evaluation of left hip pain. Pain worse with riding bike, pain is primarily anterolateral and feels sharp/pinching. Achy at rest. Sit to stand will cause sharp pain.     The patient is seen alone    Onset: 1 year ago. Reports insidious onset without acute precipitating event. Pain is gradually worsening  Location/characterization of Pain: left anterior hip, some lateral pain as well.    Worsened by: activity, laying on the left side, sitting, biking. Some pain at rest  Treatments tried:  stretching  Associated symptoms:  throbbing/aching when sitting , sharp pain when laying on it    Prior injury/Surgical history of affected joint: YES - Date: \"fell into the boards during hockey\" 1 year or longer ago.   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     REVIEW OF SYSTEMS:  Pertinent positives/negative: As stated above in HPI    OBJECTIVE:  /86   Wt 91.6 kg (202 lb)   BMI 28.98 kg/m     General: Alert and in no distress  Skin: no visable rashes  CV: Extremities appear well perfused   Resp: normal respiratory effort, no conversational dyspnea   Psych: normal mood, affect  Gait: NORMAL  MSK:  LEFT Hip Exam  Inspection:    No swelling, bruising, discoloration, or obvious deformity "   Palpation:    Tenderness to palpation of hip flexor tendons  Active Range of Motion:     Flexion  full / IR full / ER  full  Strength:    Flexion 5/5 / adduction 5/5 / abduction 5/5  Special Tests:    Positive: anterior impingement (FADIR), pain relieved with ALFONSO       RADIOLOGY:  Final results and radiologist's interpretation available in the [facility] health record.  Images below were personally reviewed and discussed with the patient in the office today.  My personal interpretation of the performed imaging: X-ray of the left hip reveals cam deformity of the proximal femur suggestive of femoral acetabular impingement      Review of prior external note(s) from - family medicine             Again, thank you for allowing me to participate in the care of your patient.        Sincerely,        Joan Holden, DO

## 2023-10-02 ENCOUNTER — THERAPY VISIT (OUTPATIENT)
Dept: PHYSICAL THERAPY | Facility: CLINIC | Age: 33
End: 2023-10-02
Attending: STUDENT IN AN ORGANIZED HEALTH CARE EDUCATION/TRAINING PROGRAM
Payer: COMMERCIAL

## 2023-10-02 DIAGNOSIS — M25.859 FEMOROACETABULAR IMPINGEMENT: ICD-10-CM

## 2023-10-02 PROCEDURE — 97161 PT EVAL LOW COMPLEX 20 MIN: CPT | Mod: GP | Performed by: PHYSICAL THERAPIST

## 2023-10-02 PROCEDURE — 97110 THERAPEUTIC EXERCISES: CPT | Mod: GP | Performed by: PHYSICAL THERAPIST

## 2023-10-02 ASSESSMENT — ACTIVITIES OF DAILY LIVING (ADL)
WALKING_INITIALLY: SLIGHT DIFFICULTY
GETTING INTO AND OUT OF AN AVERAGE CAR: NO DIFFICULTY AT ALL
WALKING_APPROXIMATELY_10_MINUTES: NO DIFFICULTY AT ALL
STANDING FOR 15 MINUTES: SLIGHT DIFFICULTY
HEAVY_WORK: NO DIFFICULTY AT ALL
SITTING FOR 15 MINUTES: MODERATE DIFFICULTY
GOING DOWN 1 FLIGHT OF STAIRS: NO DIFFICULTY AT ALL
RECREATIONAL ACTIVITIES: NO DIFFICULTY AT ALL
HOW_WOULD_YOU_RATE_YOUR_CURRENT_LEVEL_OF_FUNCTION_DURING_YOUR_USUAL_ACTIVITIES_OF_DAILY_LIVING_FROM_0_TO_100_WITH_100_BEING_YOUR_LEVEL_OF_FUNCTION_PRIOR_TO_YOUR_HIP_PROBLEM_AND_0_BEING_THE_INABILITY_TO_PERFORM_ANY_OF_YOUR_USUAL_DAILY_ACTIVITIES?: 90
HOS_ADL_HIGHEST_POTENTIAL_SCORE: 68
GETTING_INTO_AND_OUT_OF_A_BATHTUB: NO DIFFICULTY AT ALL
LIGHT_TO_MODERATE_WORK: NO DIFFICULTY AT ALL
STEPPING UP AND DOWN CURBS: NO DIFFICULTY AT ALL
WALKING_UP_STEEP_HILLS: NO DIFFICULTY AT ALL
TWISTING/PIVOTING ON INVOLVED LEG: SLIGHT DIFFICULTY
HOS_ADL_ITEM_SCORE_TOTAL: 64
GOING UP 1 FLIGHT OF STAIRS: NO DIFFICULTY AT ALL
WALKING_15_MINUTES_OR_GREATER: NO DIFFICULTY AT ALL
WALKING_DOWN_STEEP_HILLS: NO DIFFICULTY AT ALL
DEEP SQUATTING: NO DIFFICULTY AT ALL
HOS_ADL_SCORE(%): 94.12
PUTTING ON SOCKS AND SHOES: NO DIFFICULTY AT ALL
ROLLING OVER IN BED: SLIGHT DIFFICULTY

## 2023-10-02 NOTE — PROGRESS NOTES
PHYSICAL THERAPY EVALUATION  Type of Visit: Evaluation    See electronic medical record for Abuse and Falls Screening details.    Subjective  Pt reports that he's been having annoying pain in the left hip more recently at rest and with sleeping. May have started with playing hockey. Pain is worst with sleeping/at rest and worse with getting on and off of a bike and certain motions. Playing hockey and lifting without too much issue. Denies vague symptoms. Would like to get rid of this pain and return to PLOF pain free. Had a CSI without relief thus far. Wants to be able to sleep without pain.         Presenting condition or subjective complaint: Hip pain  Date of onset: 10/02/23    Relevant medical history:     Dates & types of surgery: Shoulder labrum, 2014    Prior diagnostic imaging/testing results: X-ray     Prior therapy history for the same diagnosis, illness or injury: No      Living Environment  Social support: With a significant other or spouse   Type of home: House   Stairs to enter the home: No       Ramp: No   Stairs inside the home: Yes 40 Is there a railing: Yes   Help at home: None  Equipment owned:       Employment: Yes   Hobbies/Interests: Working out, Hockey, hunting, fishing, hanging out with the family.    Patient goals for therapy: Lay down, sit, and do certain activities without any sharp pain or sorrness/throbbing.    Pain assessment: Pain present  Location: C sign left hip, down into the leg and groin/Ratin/10 now at worst up to 9/10 pain     Objective   Gait:none    Screening: negative    Flexibility: unremarkable    Hip PROM (* = pain) Right Left    115   EXT 20 20   ER 50 50   IR 15 5*     Hip Strength (* = pain) Right Left   FL 5/5 5/5   EXT 5/5 5-/5   ABD 5-/5 4/5   ADD 5/5 5/5     Special Tests Right Left   FADIR - -   ALFONSO - -   Trendelenburg's Sign - -   Farzad Test - -   Charlie's - -     Palpation tenderness: left lateral hip tender    Function: full squat  no pain, single leg squat left, good SLS sage    Other Tests: none    Assessment & Plan   CLINICAL IMPRESSIONS  Medical Diagnosis: Femoroacetabular impingement    Treatment Diagnosis: Left hip pain   Impression/Assessment: Patient is a 33 year old male with left hip complaints.  The following significant findings have been identified: Pain, Decreased ROM/flexibility, Decreased joint mobility, Decreased strength, Impaired balance, Impaired gait, Impaired muscle performance, and Decreased activity tolerance. These impairments interfere with their ability to perform self care tasks, work tasks, recreational activities, household chores, driving , household mobility, and community mobility as compared to previous level of function.     Clinical Decision Making (Complexity):  Clinical Presentation: Stable/Uncomplicated  Clinical Presentation Rationale: based on medical and personal factors listed in PT evaluation  Clinical Decision Making (Complexity): Low complexity    PLAN OF CARE  Treatment Interventions:  Interventions: Gait Training, Manual Therapy, Neuromuscular Re-education, Therapeutic Activity, Therapeutic Exercise, Self-Care/Home Management    Long Term Goals     PT Goal 1  Goal Identifier: Ambulation  Goal Description: Patient will be able to walk unrestricted distances without device pain free with normalized gati pattern  Rationale: to maximize safety and independence with performance of ADLs and functional tasks;to maximize safety and independence within the home;to maximize safety and independence within the community;to maximize safety and independence with transportation;to maximize safety and independence with self cares  Goal Progress: new goal  Target Date: 11/27/23  PT Goal 2  Goal Identifier: Squatting  Goal Description: Patient will be able to perform a full depth squat pain free without deviation pain free  Rationale: to maximize safety and independence within the community;to maximize safety and  independence with transportation;to maximize safety and independence within the home;to maximize safety and independence with performance of ADLs and functional tasks;to maximize safety and independence with self cares  Goal Progress: new goal  Target Date: 11/27/23      Frequency of Treatment: 1 x week  Duration of Treatment: 8 weeks    Recommended Referrals to Other Professionals: none  Education Assessment:   Learner/Method: Patient;No Barriers to Learning  Education Comments: no concerns    Risks and benefits of evaluation/treatment have been explained.   Patient/Family/caregiver agrees with Plan of Care.     Evaluation Time:     PT Eval, Low Complexity Minutes (60967): 15     Signing Clinician: Adair Dunlap PT

## 2023-10-03 DIAGNOSIS — M25.552 HIP PAIN, LEFT: ICD-10-CM

## 2023-10-03 RX ORDER — NAPROXEN 500 MG/1
500 TABLET ORAL 2 TIMES DAILY PRN
Qty: 45 TABLET | Refills: 0 | Status: SHIPPED | OUTPATIENT
Start: 2023-10-03 | End: 2024-03-02

## 2023-10-26 ENCOUNTER — THERAPY VISIT (OUTPATIENT)
Dept: PHYSICAL THERAPY | Facility: CLINIC | Age: 33
End: 2023-10-26
Payer: COMMERCIAL

## 2023-10-26 DIAGNOSIS — M25.859 FEMOROACETABULAR IMPINGEMENT: Primary | ICD-10-CM

## 2023-10-26 PROCEDURE — 97110 THERAPEUTIC EXERCISES: CPT | Mod: GP | Performed by: PHYSICAL THERAPIST

## 2023-10-26 ASSESSMENT — ACTIVITIES OF DAILY LIVING (ADL)
ROLLING_OVER_IN_BED: SLIGHT DIFFICULTY
SITTING_FOR_15_MINUTES: SLIGHT DIFFICULTY
HOW_WOULD_YOU_RATE_YOUR_CURRENT_LEVEL_OF_FUNCTION_DURING_YOUR_USUAL_ACTIVITIES_OF_DAILY_LIVING_FROM_0_TO_100_WITH_100_BEING_YOUR_LEVEL_OF_FUNCTION_PRIOR_TO_YOUR_HIP_PROBLEM_AND_0_BEING_THE_INABILITY_TO_PERFORM_ANY_OF_YOUR_USUAL_DAILY_ACTIVITIES?: 100
WALKING_DOWN_STEEP_HILLS: NO DIFFICULTY AT ALL
WALKING_APPROXIMATELY_10_MINUTES: NO DIFFICULTY AT ALL
HOS_ADL_ITEM_SCORE_TOTAL: 67
GETTING_INTO_AND_OUT_OF_AN_AVERAGE_CAR: NO DIFFICULTY AT ALL
WALKING_15_MINUTES_OR_GREATER: NO DIFFICULTY AT ALL
WALKING_UP_STEEP_HILLS: NO DIFFICULTY AT ALL
LIGHT_TO_MODERATE_WORK: NO DIFFICULTY AT ALL
STANDING_FOR_15_MINUTES: NO DIFFICULTY AT ALL
WALKING_INITIALLY: NO DIFFICULTY AT ALL
HOS_ADL_COUNT: 17
GETTING_INTO_AND_OUT_OF_A_BATHTUB: NO DIFFICULTY AT ALL
HOS_ADL_SCORE(%): 98.53
STEPPING_UP_AND_DOWN_CURBS: NO DIFFICULTY AT ALL
TWISTING/PIVOTING_ON_INVOLVED_LEG: NO DIFFICULTY AT ALL
HOS_ADL_HIGHEST_POTENTIAL_SCORE: 68
PUTTING_ON_SOCKS_AND_SHOES: NO DIFFICULTY AT ALL
HEAVY_WORK: NO DIFFICULTY AT ALL
GOING_UP_1_FLIGHT_OF_STAIRS: NO DIFFICULTY AT ALL
GOING_DOWN_1_FLIGHT_OF_STAIRS: NO DIFFICULTY AT ALL
DEEP_SQUATTING: NO DIFFICULTY AT ALL
RECREATIONAL_ACTIVITIES: NO DIFFICULTY AT ALL

## 2023-12-09 DIAGNOSIS — F41.9 ANXIETY DISORDER, UNSPECIFIED TYPE: ICD-10-CM

## 2023-12-11 RX ORDER — CITALOPRAM HYDROBROMIDE 10 MG/1
10 TABLET ORAL
Qty: 90 TABLET | Refills: 0 | OUTPATIENT
Start: 2023-12-11

## 2023-12-11 NOTE — TELEPHONE ENCOUNTER
Please let pt know to submit 6 months depression E-visit through [employer] for any adjustment of dose needed to be documented by PHQ-9    Thank you  Hiral Reyes MD on 12/11/2023 at 2:56 PM

## 2023-12-13 NOTE — TELEPHONE ENCOUNTER
My chart message sent to patient to start an e-visit for medication request.     Nelia Nogueira RN  [facility]

## 2023-12-29 PROBLEM — M25.859 FEMOROACETABULAR IMPINGEMENT: Status: RESOLVED | Noted: 2023-10-02 | Resolved: 2023-12-29

## 2024-01-03 ENCOUNTER — MYC REFILL (OUTPATIENT)
Dept: FAMILY MEDICINE | Facility: CLINIC | Age: 34
End: 2024-01-03
Payer: COMMERCIAL

## 2024-01-03 DIAGNOSIS — F41.9 ANXIETY DISORDER, UNSPECIFIED TYPE: ICD-10-CM

## 2024-01-03 RX ORDER — CITALOPRAM HYDROBROMIDE 10 MG/1
10 TABLET ORAL
Qty: 90 TABLET | Refills: 0 | OUTPATIENT
Start: 2024-01-03

## 2024-01-04 NOTE — TELEPHONE ENCOUNTER
Messages sent to: Cristobal Rodriguez Jr.    Delivery Read From Message Type Attachments Subject   1/3/2024 11:37 PM Y Filippo Almendarez Patient Medication Renewal Request  Your Medications Refill Request         Patient has read the my chart message to start an e-visit for medication.       Nelia NogueiraRN  [facility]

## 2024-01-04 NOTE — TELEPHONE ENCOUNTER
Pt supposed to have 6 months depression follow up as already requested in the past refill request.

## 2024-02-07 ENCOUNTER — PATIENT OUTREACH (OUTPATIENT)
Dept: CARE COORDINATION | Facility: CLINIC | Age: 34
End: 2024-02-07
Payer: COMMERCIAL

## 2024-02-21 ENCOUNTER — PATIENT OUTREACH (OUTPATIENT)
Dept: CARE COORDINATION | Facility: CLINIC | Age: 34
End: 2024-02-21
Payer: COMMERCIAL

## 2024-03-02 ENCOUNTER — MYC REFILL (OUTPATIENT)
Dept: FAMILY MEDICINE | Facility: CLINIC | Age: 34
End: 2024-03-02
Payer: COMMERCIAL

## 2024-03-02 DIAGNOSIS — M25.552 HIP PAIN, LEFT: ICD-10-CM

## 2024-03-05 RX ORDER — NAPROXEN 500 MG/1
500 TABLET ORAL 2 TIMES DAILY PRN
Qty: 45 TABLET | Refills: 0 | Status: SHIPPED | OUTPATIENT
Start: 2024-03-05

## 2024-03-05 NOTE — TELEPHONE ENCOUNTER
Routing refill request to provider for review/approval because:  Creatinine   Date Value Ref Range Status   03/08/2023 1.11 0.67 - 1.17 mg/dL Final   06/11/2021 1.24 0.66 - 1.25 mg/dL Final        Siria Puente, RN

## 2024-05-26 ENCOUNTER — HEALTH MAINTENANCE LETTER (OUTPATIENT)
Age: 34
End: 2024-05-26

## 2024-09-04 ENCOUNTER — PATIENT OUTREACH (OUTPATIENT)
Dept: CARE COORDINATION | Facility: CLINIC | Age: 34
End: 2024-09-04
Payer: COMMERCIAL

## 2024-09-21 DIAGNOSIS — E78.5 DYSLIPIDEMIA: ICD-10-CM

## 2024-09-23 RX ORDER — SIMVASTATIN 10 MG
10 TABLET ORAL AT BEDTIME
Qty: 90 TABLET | Refills: 0 | Status: SHIPPED | OUTPATIENT
Start: 2024-09-23

## 2024-09-23 NOTE — TELEPHONE ENCOUNTER
PTS  WANTED TO KNOW IF A SCRIPT CAN BE SENT FOR WIFE MEDICAL HANDICAP LIFT CHAIR. PLEASE ADVISE HE CAME IN AND SAID NO [place] JUST WANTED TO KNOW IF IT CAN BE DONE/SENT ANYWHERE. PLEASE CALL AND ADVISE FOR FURTHER DETAILS.    Patient is due for preventative visit/med check.     - Va, CNP

## 2024-10-24 ENCOUNTER — OFFICE VISIT (OUTPATIENT)
Dept: FAMILY MEDICINE | Facility: CLINIC | Age: 34
End: 2024-10-24
Payer: COMMERCIAL

## 2024-10-24 VITALS
BODY MASS INDEX: 31.07 KG/M2 | DIASTOLIC BLOOD PRESSURE: 70 MMHG | HEART RATE: 81 BPM | HEIGHT: 68 IN | SYSTOLIC BLOOD PRESSURE: 108 MMHG | TEMPERATURE: 96.9 F | OXYGEN SATURATION: 98 % | WEIGHT: 205 LBS | RESPIRATION RATE: 18 BRPM

## 2024-10-24 DIAGNOSIS — Z13.1 SCREENING FOR DIABETES MELLITUS: ICD-10-CM

## 2024-10-24 DIAGNOSIS — E78.5 DYSLIPIDEMIA: ICD-10-CM

## 2024-10-24 DIAGNOSIS — Z00.00 ROUTINE GENERAL MEDICAL EXAMINATION AT A HEALTH CARE FACILITY: Primary | ICD-10-CM

## 2024-10-24 LAB
ALBUMIN SERPL BCG-MCNC: 4.5 G/DL (ref 3.5–5.2)
ALP SERPL-CCNC: 69 U/L (ref 40–150)
ALT SERPL W P-5'-P-CCNC: 47 U/L (ref 0–70)
ANION GAP SERPL CALCULATED.3IONS-SCNC: 11 MMOL/L (ref 7–15)
AST SERPL W P-5'-P-CCNC: 31 U/L (ref 0–45)
BILIRUB SERPL-MCNC: 0.9 MG/DL
BUN SERPL-MCNC: 15.1 MG/DL (ref 6–20)
CALCIUM SERPL-MCNC: 10 MG/DL (ref 8.8–10.4)
CHLORIDE SERPL-SCNC: 104 MMOL/L (ref 98–107)
CHOLEST SERPL-MCNC: 189 MG/DL
CREAT SERPL-MCNC: 1.13 MG/DL (ref 0.67–1.17)
EGFRCR SERPLBLD CKD-EPI 2021: 87 ML/MIN/1.73M2
FASTING STATUS PATIENT QL REPORTED: YES
FASTING STATUS PATIENT QL REPORTED: YES
GLUCOSE SERPL-MCNC: 88 MG/DL (ref 70–99)
HCO3 SERPL-SCNC: 26 MMOL/L (ref 22–29)
HDLC SERPL-MCNC: 45 MG/DL
LDLC SERPL CALC-MCNC: 123 MG/DL
NONHDLC SERPL-MCNC: 144 MG/DL
POTASSIUM SERPL-SCNC: 4.5 MMOL/L (ref 3.4–5.3)
PROT SERPL-MCNC: 7.1 G/DL (ref 6.4–8.3)
SODIUM SERPL-SCNC: 141 MMOL/L (ref 135–145)
TRIGL SERPL-MCNC: 107 MG/DL

## 2024-10-24 PROCEDURE — 80061 LIPID PANEL: CPT | Performed by: NURSE PRACTITIONER

## 2024-10-24 PROCEDURE — 99395 PREV VISIT EST AGE 18-39: CPT | Performed by: NURSE PRACTITIONER

## 2024-10-24 PROCEDURE — 99213 OFFICE O/P EST LOW 20 MIN: CPT | Mod: 25 | Performed by: NURSE PRACTITIONER

## 2024-10-24 PROCEDURE — 80053 COMPREHEN METABOLIC PANEL: CPT | Performed by: NURSE PRACTITIONER

## 2024-10-24 PROCEDURE — 36415 COLL VENOUS BLD VENIPUNCTURE: CPT | Performed by: NURSE PRACTITIONER

## 2024-10-24 RX ORDER — SIMVASTATIN 10 MG
10 TABLET ORAL AT BEDTIME
Qty: 90 TABLET | Refills: 3 | Status: SHIPPED | OUTPATIENT
Start: 2024-10-24

## 2024-10-24 SDOH — HEALTH STABILITY: PHYSICAL HEALTH: ON AVERAGE, HOW MANY MINUTES DO YOU ENGAGE IN EXERCISE AT THIS LEVEL?: 40 MIN

## 2024-10-24 SDOH — HEALTH STABILITY: PHYSICAL HEALTH: ON AVERAGE, HOW MANY DAYS PER WEEK DO YOU ENGAGE IN MODERATE TO STRENUOUS EXERCISE (LIKE A BRISK WALK)?: 3 DAYS

## 2024-10-24 ASSESSMENT — PATIENT HEALTH QUESTIONNAIRE - PHQ9
5. POOR APPETITE OR OVEREATING: NOT AT ALL
SUM OF ALL RESPONSES TO PHQ QUESTIONS 1-9: 2

## 2024-10-24 ASSESSMENT — SOCIAL DETERMINANTS OF HEALTH (SDOH): HOW OFTEN DO YOU GET TOGETHER WITH FRIENDS OR RELATIVES?: TWICE A WEEK

## 2024-10-24 ASSESSMENT — ASTHMA QUESTIONNAIRES
ACT_TOTALSCORE: 25
QUESTION_3 LAST FOUR WEEKS HOW OFTEN DID YOUR ASTHMA SYMPTOMS (WHEEZING, COUGHING, SHORTNESS OF BREATH, CHEST TIGHTNESS OR PAIN) WAKE YOU UP AT NIGHT OR EARLIER THAN USUAL IN THE MORNING: NOT AT ALL
QUESTION_1 LAST FOUR WEEKS HOW MUCH OF THE TIME DID YOUR ASTHMA KEEP YOU FROM GETTING AS MUCH DONE AT WORK, SCHOOL OR AT HOME: NONE OF THE TIME
ACT_TOTALSCORE: 25
QUESTION_2 LAST FOUR WEEKS HOW OFTEN HAVE YOU HAD SHORTNESS OF BREATH: NOT AT ALL
QUESTION_4 LAST FOUR WEEKS HOW OFTEN HAVE YOU USED YOUR RESCUE INHALER OR NEBULIZER MEDICATION (SUCH AS ALBUTEROL): NOT AT ALL
QUESTION_5 LAST FOUR WEEKS HOW WOULD YOU RATE YOUR ASTHMA CONTROL: COMPLETELY CONTROLLED

## 2024-10-24 ASSESSMENT — ANXIETY QUESTIONNAIRES
6. BECOMING EASILY ANNOYED OR IRRITABLE: NOT AT ALL
GAD7 TOTAL SCORE: 0
IF YOU CHECKED OFF ANY PROBLEMS ON THIS QUESTIONNAIRE, HOW DIFFICULT HAVE THESE PROBLEMS MADE IT FOR YOU TO DO YOUR WORK, TAKE CARE OF THINGS AT HOME, OR GET ALONG WITH OTHER PEOPLE: NOT DIFFICULT AT ALL
GAD7 TOTAL SCORE: 0
1. FEELING NERVOUS, ANXIOUS, OR ON EDGE: NOT AT ALL
7. FEELING AFRAID AS IF SOMETHING AWFUL MIGHT HAPPEN: NOT AT ALL
5. BEING SO RESTLESS THAT IT IS HARD TO SIT STILL: NOT AT ALL
3. WORRYING TOO MUCH ABOUT DIFFERENT THINGS: NOT AT ALL
2. NOT BEING ABLE TO STOP OR CONTROL WORRYING: NOT AT ALL

## 2024-10-24 NOTE — PROGRESS NOTES
"Preventive Care Visit  [facility]  MACARENA Dunbar CNP, Family Medicine  Oct 24, 2024      Assessment & Plan     Routine general medical examination at a health care facility     Dyslipidemia  Currently taking simvastatin 10 mg, started in March 2023. Pending lipid recheck, may need dose change.   - simvastatin (ZOCOR) 10 MG tablet  Dispense: 90 tablet; Refill: 3  - Lipid panel reflex to direct LDL Non-fasting    Screening for diabetes mellitus  - Comprehensive metabolic panel (BMP + Alb, Alk Phos, ALT, AST, Total. Bili, TP)      BMI  Estimated body mass index is 31.17 kg/m  as calculated from the following:    Height as of this encounter: 1.727 m (5' 8\").    Weight as of this encounter: 93 kg (205 lb).     Counseling  Appropriate preventive services were addressed with this patient.  Checklist reviewing preventive services available has been given to the patient.    Return in about 1 year (around 10/24/2025) for Preventative Visit + Med Check.        Sun Jain is a 34 year old, presenting for the following:  Physical        10/24/2024     7:12 AM   Additional Questions   Roomed by Elsie CAPONE          HPI- Patient presents today for annual physical.     Uses Albuterol inhaler PRN for hockey. Denies asthma.     Hyperlipidemia Follow-Up  Recent Labs   Lab Test 03/08/23  0901 03/26/21  0921   CHOL 261* 213*   HDL 56 52   * 151*   TRIG 117 49     Are you regularly taking any medication or supplement to lower your cholesterol?   Yes- Simvastatin  Are you having muscle aches or other side effects that you think could be caused by your cholesterol lowering medication?  No    Started simvastatin in March 2023. Denies side effects and states medication therapy is going well. Patient is fasting, will recheck lipids today.     Depression and Anxiety   How are you doing with your depression since your last visit? No change  How are you doing with your anxiety since your last " CC: Well woman exam    Jacquie Mora is a 66 y.o. female  presents for well woman exam.    LMP: No LMP recorded. Patient is postmenopausal..    Last mammogram: 11/10/2022; Pt is scheduled in 2024  Last Colonoscopy: unknown; Pt states she had it once and does not want to do it again. Pt agrees to Cologuard.    Pt complains of weight and blood pressure.    Past Medical History:   Diagnosis Date    Esophageal reflux     Hypercholesteremia     Hypertension     Obesity      Past Surgical History:   Procedure Laterality Date    COLONOSCOPY      KNEE SURGERY       Social History     Socioeconomic History    Marital status:    Tobacco Use    Smoking status: Never     Passive exposure: Never    Smokeless tobacco: Never   Substance and Sexual Activity    Alcohol use: Yes     Comment: socially    Drug use: Not Currently    Sexual activity: Yes     Partners: Male     Family History   Problem Relation Age of Onset    Lung cancer Father     No Known Problems Mother     No Known Problems Sister     No Known Problems Son      OB History          1    Para   1    Term   1            AB        Living             SAB        IAB        Ectopic        Multiple        Live Births                     BP (!) 172/81   Pulse (!) 58   Wt 113.2 kg (249 lb 9.6 oz)   BMI 37.95 kg/m²       ROS:  GENERAL: Denies weight gain or weight loss. Feeling well overall.   SKIN: Denies rash or lesions.   HEAD: Denies head injury or headache.   NODES: Denies enlarged lymph nodes.   CHEST: Denies chest pain or shortness of breath.   CARDIOVASCULAR: Denies palpitations or left sided chest pain.   ABDOMEN: No abdominal pain, constipation, diarrhea, nausea, vomiting or rectal bleeding.   URINARY: No frequency, dysuria, hematuria, or burning on urination.  REPRODUCTIVE: See HPI.   BREASTS: The patient performs breast self-examination and denies pain, lumps, or nipple discharge.   HEMATOLOGIC: No easy bruisability or  excessive bleeding.   MUSCULOSKELETAL: Denies joint pain or swelling.   NEUROLOGIC: Denies syncope or weakness.   PSYCHIATRIC: Denies depression, anxiety or mood swings.    PHYSICAL EXAM:  APPEARANCE: Well nourished, well developed, in no acute distress.  AFFECT: WNL, alert and oriented x 3  SKIN: No acne or hirsutism  NECK: Neck symmetric without masses or thyromegaly  NODES: No inguinal, cervical, axillary, or femoral lymph node enlargement  CHEST: Good respiratory effect  ABDOMEN: Soft.  No tenderness or masses.  No hepatosplenomegaly.  No hernias.  BREASTS: Symmetrical, no skin changes or visible lesions.  No palpable masses, nipple discharge bilaterally.  PELVIC: Normal external genitalia without lesions.  Normal hair distribution.  Adequate perineal body, normal urethral meatus.  Vagina moist and well rugated without lesions or discharge.  Cervix pink, without lesions, discharge or tenderness.  No significant cystocele or rectocele.  Bimanual exam shows uterus to be normal size, regular, mobile and nontender.  Adnexa without masses or tenderness.    EXTREMITIES: No edema.    Routine gynecological examination  -     ThinPrep Pap Test; Future; Expected date: 12/20/2023    Cervical cancer screening  -     ThinPrep Pap Test; Future; Expected date: 12/20/2023    Colon cancer screening  -     Cologuard Screening (Multitarget Stool DNA); Future; Expected date: 12/20/2023    Obesity (BMI 30.0-34.9)  -     naltrexone-bupropion (CONTRAVE) 8-90 mg TbSR; Take 1 tablet by mouth twice a week.  Dispense: 60 tablet; Refill: 2            Patient was counseled today on A.C.S. Pap guidelines and recommendations for yearly pelvic exams, mammograms and monthly self breast exams; to see her PCP for other health maintenance.   Exercise regimen encouraged  Healthy food choices encouraged  Questions answered to desired level of satisfaction  Verbalized understanding to all information and instructions    Follow up in about 4 weeks  visit?  No change  Are you having other symptoms that might be associated with depression or anxiety? No  Have you had a significant life event? No   Do you have any concerns with your use of alcohol or other drugs? No    Currently taking citalopram, started 2 years ago. States his depression and anxiety is well-controlled, currently sees [facility] for medication management.     Social History     Tobacco Use    Smoking status: Never    Smokeless tobacco: Never   Substance Use Topics    Alcohol use: Yes     Comment: once weekly    Drug use: No         3/26/2021     9:43 AM 11/9/2022     9:40 AM   PHQ   PHQ-9 Total Score 4 2   Q9: Thoughts of better off dead/self-harm past 2 weeks Not at all Not at all        Patient-reported            3/26/2021     9:43 AM 11/8/2022     9:18 AM   VOLODYMYR-7 SCORE   Total Score  19 (severe anxiety)   Total Score 9 19       Health Care Directive  Patient does not have a Health Care Directive      10/24/2024   General Health   How would you rate your overall physical health? Good   Feel stress (tense, anxious, or unable to sleep) To some extent      (!) STRESS CONCERN      10/24/2024   Nutrition   Three or more servings of calcium each day? Yes   Diet: Regular (no restrictions)   How many servings of fruit and vegetables per day? (!) 2-3   How many sweetened beverages each day? 0-1            10/24/2024   Exercise   Days per week of moderate/strenous exercise 3 days   Average minutes spent exercising at this level 40 min            10/24/2024   Social Factors   Frequency of gathering with friends or relatives Twice a week   Worry food won't last until get money to buy more No   Food not last or not have enough money for food? No   Do you have housing? (Housing is defined as stable permanent housing and does not include staying ouside in a car, in a tent, in an abandoned building, in an overnight shelter, or couch-surfing.) No   Are you worried about losing your housing? No    Lack of transportation? No   Unable to get utilities (heat,electricity)? No   Want help with housing or utility concern? No      (!) HOUSING CONCERN PRESENT      10/24/2024   Dental   Dentist two times every year? Yes            10/24/2024   TB Screening   Were you born outside of the [place]? No            Today's PHQ-2 Score:       10/24/2024     7:09 AM   PHQ-2 ( 1999 [employer])   Q1: Little interest or pleasure in doing things 0    Q2: Feeling down, depressed or hopeless 0    PHQ-2 Score 0    Q1: Little interest or pleasure in doing things Not at all   Q2: Feeling down, depressed or hopeless Not at all   PHQ-2 Score 0       Patient-reported           10/24/2024   Substance Use   Alcohol more than 3/day or more than 7/wk No   Do you use any other substances recreationally? No        Social History     Tobacco Use    Smoking status: Never    Smokeless tobacco: Never   Substance Use Topics    Alcohol use: Yes     Comment: once weekly    Drug use: No         10/24/2024   STI Screening   New sexual partner(s) since last STI/HIV test? No            10/24/2024   Contraception/Family Planning   Questions about contraception or family planning No      Reviewed and updated as needed this visit by Provider    Review of Systems  CONSTITUTIONAL: NEGATIVE for fever, chills, change in weight  INTEGUMENTARY/SKIN: NEGATIVE for worrisome rashes, moles or lesions  EYES: NEGATIVE for vision changes or irritation  ENT/MOUTH: NEGATIVE for ear, mouth and throat problems  RESP: NEGATIVE for significant cough or SOB  CV: NEGATIVE for chest pain, palpitations or peripheral edema  GI: NEGATIVE for nausea, abdominal pain, heartburn, or change in bowel habits  : NEGATIVE for frequency, dysuria, or hematuria  MUSCULOSKELETAL: NEGATIVE for significant arthralgias or myalgia, POSITIVE for left hip pain  NEURO: NEGATIVE for weakness, dizziness or paresthesias  ENDOCRINE: NEGATIVE for temperature intolerance, skin/hair changes  HEME: NEGATIVE for  (around 1/17/2024) for Follow up in month and 1 year annual.      Shelby Issa M.D., FCOG    OB/GYN                 "bleeding problems  PSYCHIATRIC: NEGATIVE for changes in mood or affect     Objective    Exam  /70   Pulse 81   Temp 96.9  F (36.1  C) (Tympanic)   Resp 18   Ht 1.727 m (5' 8\")   Wt 93 kg (205 lb)   SpO2 98%   BMI 31.17 kg/m     Estimated body mass index is 31.17 kg/m  as calculated from the following:    Height as of this encounter: 1.727 m (5' 8\").    Weight as of this encounter: 93 kg (205 lb).    Physical Exam  GENERAL: alert and no distress  EYES: Eyes grossly normal to inspection  HENT: ear canals and TM's normal, nose and mouth without ulcers or lesions  RESP: lungs clear to auscultation - no rales, rhonchi or wheezes  CV: regular rate and rhythm, normal S1 S2, no S3 or S4, no murmur, click or rub, no peripheral edema  ABDOMEN: soft, nontender, no hepatosplenomegaly, no masses and bowel sounds normal  MS: no gross musculoskeletal defects noted, no edema  SKIN: no suspicious lesions or rashes  NEURO: Normal strength and tone, mentation intact and speech normal  PSYCH: mentation appears normal, affect normal/bright    Written by Gris Delarosa DNP-FNP Student       I was present with the student who participated in the service and in the documentation of the note, which I have reviewed and verified. The history, reviews of systems, objective data, and assessment/plan were completed by myself.    Signed Electronically by: MACARENA Dunbar CNP    "

## 2024-10-24 NOTE — PATIENT INSTRUCTIONS
Patient Education   Preventive Care Advice   This is general advice given by our system to help you stay healthy. However, your care team may have specific advice just for you. Please talk to your care team about your preventive care needs.  Nutrition  Eat 5 or more servings of fruits and vegetables each day.  Try wheat bread, brown rice and whole grain pasta (instead of white bread, rice, and pasta).  Get enough calcium and vitamin D. Check the label on foods and aim for 100% of the RDA (recommended daily allowance).  Lifestyle  Exercise at least 150 minutes each week  (30 minutes a day, 5 days a week).  Do muscle strengthening activities 2 days a week. These help control your weight and prevent disease.  No smoking.  Wear sunscreen to prevent skin cancer.  Have a dental exam and cleaning every 6 months.  Yearly exams  See your health care team every year to talk about:  Any changes in your health.  Any medicines your care team has prescribed.  Preventive care, family planning, and ways to prevent chronic diseases.  Shots (vaccines)   HPV shots (up to age 26), if you've never had them before.  Hepatitis B shots (up to age 59), if you've never had them before.  COVID-19 shot: Get this shot when it's due.  Flu shot: Get a flu shot every year.  Tetanus shot: Get a tetanus shot every 10 years.  Pneumococcal, hepatitis A, and RSV shots: Ask your care team if you need these based on your risk.  Shingles shot (for age 50 and up)  General health tests  Diabetes screening:  Starting at age 35, Get screened for diabetes at least every 3 years.  If you are younger than age 35, ask your care team if you should be screened for diabetes.  Cholesterol test: At age 39, start having a cholesterol test every 5 years, or more often if advised.  Bone density scan (DEXA): At age 50, ask your care team if you should have this scan for osteoporosis (brittle bones).  Hepatitis C: Get tested at least once in your life.  STIs (sexually  transmitted infections)  Before age 24: Ask your care team if you should be screened for STIs.  After age 24: Get screened for STIs if you're at risk. You are at risk for STIs (including HIV) if:  You are sexually active with more than one person.  You don't use condoms every time.  You or a partner was diagnosed with a sexually transmitted infection.  If you are at risk for HIV, ask about PrEP medicine to prevent HIV.  Get tested for HIV at least once in your life, whether you are at risk for HIV or not.  Cancer screening tests  Cervical cancer screening: If you have a cervix, begin getting regular cervical cancer screening tests starting at age 21.  Breast cancer scan (mammogram): If you've ever had breasts, begin having regular mammograms starting at age 40. This is a scan to check for breast cancer.  Colon cancer screening: It is important to start screening for colon cancer at age 45.  Have a colonoscopy test every 10 years (or more often if you're at risk) Or, ask your provider about stool tests like a FIT test every year or Cologuard test every 3 years.  To learn more about your testing options, visit:   .  For help making a decision, visit:   https://bit.ly/im65416.  Prostate cancer screening test: If you have a prostate, ask your care team if a prostate cancer screening test (PSA) at age 55 is right for you.  Lung cancer screening: If you are a current or former smoker ages 50 to 80, ask your care team if ongoing lung cancer screenings are right for you.  For informational purposes only. Not to replace the advice of your health care provider. Copyright   2023 [employer]. All rights reserved. Clinically reviewed by the [facility] Transitions Program. [employer] [id number] - REV 01/24.

## 2024-10-28 NOTE — RESULT ENCOUNTER NOTE
Dear Cristobal,     -LDL(bad) cholesterol level is elevated. A diet high in fat and simple carbohydrates, genetics and being overweight can contribute to this. ADVISE: exercising 150 minutes of aerobic exercise per week (30 minutes for 5 days per week or 50 minutes for 3 days per week are options) and eating a low saturated fat/low carbohydrate diet are helpful to improve this.    -Liver and gallbladder tests are normal (ALT,AST, Alk phos, bilirubin), kidney function is normal (Cr, GFR), sodium is normal, potassium is normal, calcium is normal, glucose is normal.        Thank you,     Anna Marie Padron, APRN, CNP  [facility] [place]    If you have further questions about the interpretation of your labs, labtestsonline.org is a good website to check out for further information.

## 2025-02-13 ENCOUNTER — OFFICE VISIT (OUTPATIENT)
Dept: ORTHOPEDICS | Facility: CLINIC | Age: 35
End: 2025-02-13
Payer: COMMERCIAL

## 2025-02-13 ENCOUNTER — ANCILLARY PROCEDURE (OUTPATIENT)
Dept: GENERAL RADIOLOGY | Facility: CLINIC | Age: 35
End: 2025-02-13
Attending: STUDENT IN AN ORGANIZED HEALTH CARE EDUCATION/TRAINING PROGRAM
Payer: COMMERCIAL

## 2025-02-13 VITALS — HEART RATE: 81 BPM | SYSTOLIC BLOOD PRESSURE: 138 MMHG | DIASTOLIC BLOOD PRESSURE: 87 MMHG

## 2025-02-13 DIAGNOSIS — M25.512 CHRONIC PAIN OF BOTH SHOULDERS: ICD-10-CM

## 2025-02-13 DIAGNOSIS — G89.29 CHRONIC PAIN OF BOTH SHOULDERS: ICD-10-CM

## 2025-02-13 DIAGNOSIS — M25.511 CHRONIC PAIN OF BOTH SHOULDERS: ICD-10-CM

## 2025-02-13 DIAGNOSIS — M75.41 CORACOID IMPINGEMENT OF RIGHT SHOULDER: Primary | ICD-10-CM

## 2025-02-13 DIAGNOSIS — M75.42 CORACOID IMPINGEMENT OF LEFT SHOULDER: ICD-10-CM

## 2025-02-13 NOTE — PATIENT INSTRUCTIONS
1. Coracoid impingement of right shoulder    2. Chronic pain of both shoulders    3. Coracoid impingement of left shoulder        Plan:  -Start home exercise program, work on regular shoulder stretching, especially chest/anterior shoulder   -Consider injections such as trigger points or shoulder bursa injections  -Consider manual therapy or returning to PT  -Can try massage, rolfing to help with tight muscles. Can also try heat, regular stretching   -Continue to get regular exercise, work on upper back strengthening (rows)  -Consider medication or referral to pain management in future if no improvement or worsen   -Avoid laying on side at night if possible. Consider bolster pillow under knees   -Ibuprofen as needed for pain   -Check computer at work- aim to have eye level at 1/3 the way from the top of your screen    If you had imaging performed/ordered at your appointment today, you can expect to see your radiology results in [facility] within approximately 48 business hours.     If you have questions/concerns after your appointment, please send my team a [facility] message or call the clinic at [phone].     Dr. Joan Holden, , [facility]  Sports Medicine and Orthopedics    Dr. Holden's Clinic Locations and Contact Numbers:   [place] APPOINTMENTS: [phone]      [address] [facility] RADIOLOGY: [phone]   [place] [address] PHYSICAL THERAPY: [phone]    HAND THERAPY/OT: [phone]   [place] BILLING QUESTIONS: [phone] [address] FAX: [phone]   [place] [address]       Rotator Cuff: Exercises  Introduction  Here are some examples of exercises for you to try to help with your rotator cuff tendonitis. The exercises may be suggested for a condition or for rehabilitation. Start each exercise slowly. Ease off the exercises if you start to have pain. You will be told when to start these exercises and which ones will work best for you.    How to do the  exercises    Pendulum swing- Start with this exercise first    If you have pain in your back, do not do this exercise.  Hold on to a table or the back of a chair with your good arm. Then bend forward a little and let your sore arm hang straight down. This exercise does not use the arm muscles. Rather, use your legs and your hips to create movement that makes your arm swing freely.  Use the movement from your hips and legs to guide the slightly swinging arm back and forth like a pendulum (or elephant trunk). Then guide it in circles that start small (about the size of a dinner plate). Make the circles a bit larger each day, as your pain allows.  Do this exercise for 10 swings in each direction (clockwise and counter-clockwise), 3-4 times each day.  As you have less pain, try bending over a little farther to do this exercise. This will increase the amount of movement at your shoulder.    Posterior stretching exercise    Hold the elbow of your injured arm with your other hand.  Use your hand to pull your injured arm gently up and across your body. You will feel a gentle stretch across the back of your injured shoulder.  Hold for at least 15 to 30 seconds. Then slowly lower your arm.  Repeat 2 to 4 times.          Shoulder flexor and extensor exercise    These are isometric exercises. That means you contract your muscles without actually moving.  Push forward (flex): Stand facing a wall or doorjamb, about 6 inches or less back. Hold your injured arm against your body. Make a closed fist with your thumb on top. Then gently push your hand forward into the wall with about 25% to 50% of your strength. Don't let your body move backward as you push. Hold for about 6 seconds. Relax for a few seconds. Repeat 8 to 12 times.  Push backward (extend): Stand with your back flat against a wall. Your upper arm should be against the wall, with your elbow bent 90 degrees (your hand straight ahead). Push your elbow gently back against  the wall with about 25% to 50% of your strength. Don't let your body move forward as you push. Hold for about 6 seconds. Relax for a few seconds. Repeat 8 to 12 times.    Scapular exercise: Wall push-ups    This exercise is best done with your fingers somewhat turned out, rather than straight up and down.  Stand facing a wall, about 12 inches to 18 inches away.  Place your hands on the wall at shoulder height.  Slowly bend your elbows and bring your face to the wall. Keep your back and hips straight.  Push back to where you started.  Repeat 8 to 12 times.  When you can do this exercise against a wall comfortably, you can try it against a counter. You can then slowly progress to the end of a couch, then to a sturdy chair, and finally to the floor.    Scapular exercise: Retraction    For this exercise, you will need elastic exercise material, such as surgical tubing or Thera-Band.  Put the band around a solid object at about waist level. (A bedpost will work well.) Each hand should hold an end of the band.  With your elbows at your sides and bent to 90 degrees, pull the band back. Your shoulder blades should move toward each other. Then move your arms back where you started.  Repeat 8 to 12 times.  If you have good range of motion in your shoulders, try this exercise with your arms lifted out to the sides. Keep your elbows at a 90-degree angle. Raise the elastic band up to about shoulder level. Pull the band back to move your shoulder blades toward each other. Then move your arms back where you started.    Internal rotator strengthening exercise    Start by tying a piece of elastic exercise material to a doorknob. You can use surgical tubing or Thera-Band.  Stand or sit with your shoulder relaxed and your elbow bent 90 degrees. Your upper arm should rest comfortably against your side. Squeeze a rolled towel between your elbow and your body for comfort. This will help keep your arm at your side.  Hold one end of the  elastic band in the hand of the painful arm.  Slowly rotate your forearm toward your body until it touches your belly. Slowly move it back to where you started.  Keep your elbow and upper arm firmly tucked against the towel roll or at your side.  Repeat 8 to 12 times.    External rotator strengthening exercise    Start by tying a piece of elastic exercise material to a doorknob. You can use surgical tubing or Thera-Band. (You may also hold one end of the band in each hand.)  Stand or sit with your shoulder relaxed and your elbow bent 90 degrees. Your upper arm should rest comfortably against your side. Squeeze a rolled towel between your elbow and your body for comfort. This will help keep your arm at your side.  Hold one end of the elastic band with the hand of the painful arm.  Start with your forearm across your belly. Slowly rotate the forearm out away from your body. Keep your elbow and upper arm tucked against the towel roll or the side of your body until you begin to feel tightness in your shoulder. Slowly move your arm back to where you started.  Repeat 8 to 12 times.        Follow-up care is a key part of your treatment and safety. Be sure to make and go to all appointments, and call your doctor if you are having problems. It's also a good idea to know your test results and keep a list of the medicines you take.    Current as of: November 9, 2022               Content Version: 13.7    6001-5576 [employer].   Care instructions adapted under license by your healthcare professional. If you have questions about a medical condition or this instruction, always ask your healthcare professional. [employer] disclaims any warranty or liability for your use of this information.

## 2025-02-13 NOTE — PROGRESS NOTES
ASSESSMENT & PLAN    Cristobal was seen today for pain and pain.    Diagnoses and all orders for this visit:    Coracoid impingement of right shoulder    Chronic pain of both shoulders  -     XR Shoulder Bilateral G/E 2 Views; Future    Coracoid impingement of left shoulder        34 year old male with a history of left shoulder labral tear approximately 10 years ago with shoulder arthroscopy presents with bilateral shoulder pain.  He has a family history of fibromyalgia, and also reports continued hip pain as well as bilateral knee pain.  X-rays significant for postoperative changes in left shoulder.  His exam is relatively benign with full range of motion of bilateral shoulders, good rotator cuff strength, no significant impingement signs, and full neck range of motion bilaterally.  He does have some pain over coracoid bilaterally consistent with some coracoacromial impingement, however discussed given his fairly widespread pain and family history of fibromyalgia, myofascial pain/fibromyalgia remains on the differential.  He has not been able to actively participate in regular PT in the past so we will defer this today and proceed with following plan:    Plan:  -Start home exercise program, work on regular shoulder stretching, especially chest/anterior shoulder   -Consider injections such as trigger points or shoulder bursa injections  -Consider manual therapy or returning to PT  -Can try massage, rolfing to help with tight muscles. Can also try heat, regular stretching   -Continue to get regular exercise, work on upper back strengthening (rows)  -Consider medication or referral to pain management in future if no improvement or worsen   -Avoid laying on side at night if possible. Consider bolster pillow under knees   -Ibuprofen as needed for pain   -Check computer at work- aim to have eye level at 1/3 the way from the top of your screen      Dr. Joan Holden, DO, CAQ  [facility]  Sports  Medicine     -----  Chief Complaint   Patient presents with    Right Shoulder - Pain    Left Shoulder - Pain       SUBJECTIVE  Cristobal Rodriguez Jr. is a/an 34 year old male who is seen as a self referral for evaluation of bilateral shoulder pain. This started 1 month(s) ago, with insidious onset. Pain is located over the bilateral  anterior shoulders . Symptoms are worsened by carrying anything and  laying on his side. He has tried no treatment to date. Associated symptoms include: numbness and tingling. Pain wakes him up at night, worse when sleeping on side. No neck pain.     The patient is seen alone    Prior injury/Surgical history of affected joint: Labral tear of left shoulder, repair about 10 years ago  Social History/Occupation: IT    REVIEW OF SYSTEMS:  Pertinent positives/negative: As stated above in HPI    OBJECTIVE:  /87   Pulse 81    General: Alert and in no distress  CV: Extremities appear well perfused   Resp: normal respiratory effort  MSK:  Bilateral Shoulder Exam:  Appearance: Symmetric shoulder heights, no scapular winging observed  Palpation: Tender to palpation of right coracoacromial joint  Rotator cuff strength:    Supraspinatus: 5/5   ER: 5/5   IR: 5/5  AROM:    Forward flexion: 180   Abduction: 180   ER at side: 70   IR: To belt line  Special tests: + for None       RADIOLOGY:  Final results and radiologist's interpretation available in the [facility] health record.  Images below were personally reviewed and discussed with the patient in the office today.  My personal interpretation of the performed imaging: X-ray of the left shoulder reveals postsurgical changes of labral repair mild degenerative changes.        35 minutes spent by me on the date of the encounter doing chart review, history and exam, documentation and further activities per the note         Disclaimer: This note consists of text and symbols derived from dictation and/or voice recognition software. As a result, there may be  errors in the script that have gone undetected. Please consider this when interpreting information found in this chart.

## 2025-02-13 NOTE — LETTER
2/13/2025      Cristobal Rodriguez Jr.  [address] [place] [id number]      Dear Colleague,    Thank you for referring your patient, Cristobal Rodriguez Jr., to the [facility] [place]. Please see a copy of my visit note below.    ASSESSMENT & PLAN    Cristobal was seen today for pain and pain.    Diagnoses and all orders for this visit:    Coracoid impingement of right shoulder    Chronic pain of both shoulders  -     XR Shoulder Bilateral G/E 2 Views; Future    Coracoid impingement of left shoulder        34 year old male with a history of left shoulder labral tear approximately 10 years ago with shoulder arthroscopy presents with bilateral shoulder pain.  He has a family history of fibromyalgia, and also reports continued hip pain as well as bilateral knee pain.  X-rays significant for postoperative changes in left shoulder.  His exam is relatively benign with full range of motion of bilateral shoulders, good rotator cuff strength, no significant impingement signs, and full neck range of motion bilaterally.  He does have some pain over coracoid bilaterally consistent with some coracoacromial impingement, however discussed given his fairly widespread pain and family history of fibromyalgia, myofascial pain/fibromyalgia remains on the differential.  He has not been able to actively participate in regular PT in the past so we will defer this today and proceed with following plan:    Plan:  -Start home exercise program, work on regular shoulder stretching, especially chest/anterior shoulder   -Consider injections such as trigger points or shoulder bursa injections  -Consider manual therapy or returning to PT  -Can try massage, rolfing to help with tight muscles. Can also try heat, regular stretching   -Continue to get regular exercise, work on upper back strengthening (rows)  -Consider medication or referral to pain management in future if no improvement or worsen   -Avoid laying on side at night if  possible. Consider bolster pillow under knees   -Ibuprofen as needed for pain   -Check computer at work- aim to have eye level at 1/3 the way from the top of your screen      Dr. Joan Holden, DO, CAQ  [facility]  Sports Medicine     -----  Chief Complaint   Patient presents with     Right Shoulder - Pain     Left Shoulder - Pain       SUBJECTIVE  Cristobal Rodriguez Jr. is a/an 34 year old male who is seen as a self referral for evaluation of bilateral shoulder pain. This started 1 month(s) ago, with insidious onset. Pain is located over the bilateral  anterior shoulders . Symptoms are worsened by carrying anything and  laying on his side. He has tried no treatment to date. Associated symptoms include: numbness and tingling. Pain wakes him up at night, worse when sleeping on side. No neck pain.     The patient is seen alone    Prior injury/Surgical history of affected joint: Labral tear of left shoulder, repair about 10 years ago  Social History/Occupation: IT    REVIEW OF SYSTEMS:  Pertinent positives/negative: As stated above in HPI    OBJECTIVE:  /87   Pulse 81    General: Alert and in no distress  CV: Extremities appear well perfused   Resp: normal respiratory effort  MSK:  Bilateral Shoulder Exam:  Appearance: Symmetric shoulder heights, no scapular winging observed  Palpation: Tender to palpation of right coracoacromial joint  Rotator cuff strength:    Supraspinatus: 5/5   ER: 5/5   IR: 5/5  AROM:    Forward flexion: 180   Abduction: 180   ER at side: 70   IR: To belt line  Special tests: + for None       RADIOLOGY:  Final results and radiologist's interpretation available in the [facility] health record.  Images below were personally reviewed and discussed with the patient in the office today.  My personal interpretation of the performed imaging: X-ray of the left shoulder reveals postsurgical changes of labral repair mild degenerative changes.        35 minutes spent by me on the  date of the encounter doing chart review, history and exam, documentation and further activities per the note         Disclaimer: This note consists of text and symbols derived from dictation and/or voice recognition software. As a result, there may be errors in the script that have gone undetected. Please consider this when interpreting information found in this chart.        Again, thank you for allowing me to participate in the care of your patient.        Sincerely,        Joan Holden, DO    Electronically signed

## 2025-02-21 ENCOUNTER — ANCILLARY PROCEDURE (OUTPATIENT)
Dept: GENERAL RADIOLOGY | Facility: CLINIC | Age: 35
End: 2025-02-21
Attending: STUDENT IN AN ORGANIZED HEALTH CARE EDUCATION/TRAINING PROGRAM
Payer: COMMERCIAL

## 2025-02-21 DIAGNOSIS — M25.571 ACUTE RIGHT ANKLE PAIN: ICD-10-CM

## 2025-02-21 PROCEDURE — 73610 X-RAY EXAM OF ANKLE: CPT | Mod: TC | Performed by: STUDENT IN AN ORGANIZED HEALTH CARE EDUCATION/TRAINING PROGRAM

## 2025-03-13 ENCOUNTER — TELEPHONE (OUTPATIENT)
Dept: ORTHOPEDICS | Facility: CLINIC | Age: 35
End: 2025-03-13

## 2025-03-20 ENCOUNTER — TELEPHONE (OUTPATIENT)
Dept: ORTHOPEDICS | Facility: CLINIC | Age: 35
End: 2025-03-20

## 2025-03-20 NOTE — TELEPHONE ENCOUNTER
Dr Holden will be out of the office on 03/21/25 so I left a message that we need to reschedule the appointment.

## 2025-03-27 ENCOUNTER — ANCILLARY PROCEDURE (OUTPATIENT)
Dept: GENERAL RADIOLOGY | Facility: CLINIC | Age: 35
End: 2025-03-27
Attending: STUDENT IN AN ORGANIZED HEALTH CARE EDUCATION/TRAINING PROGRAM
Payer: COMMERCIAL

## 2025-03-27 ENCOUNTER — OFFICE VISIT (OUTPATIENT)
Dept: ORTHOPEDICS | Facility: CLINIC | Age: 35
End: 2025-03-27
Payer: COMMERCIAL

## 2025-03-27 VITALS — WEIGHT: 210 LBS | BODY MASS INDEX: 30.06 KG/M2 | HEIGHT: 70 IN

## 2025-03-27 DIAGNOSIS — M25.562 ACUTE PAIN OF LEFT KNEE: ICD-10-CM

## 2025-03-27 DIAGNOSIS — M17.0 BILATERAL PRIMARY OSTEOARTHRITIS OF KNEE: Primary | ICD-10-CM

## 2025-03-27 NOTE — LETTER
3/27/2025      rCistobal Rodriguez Jr.  [address] [facility] [address]      Dear Colleague,    Thank you for referring your patient, Cristobal Rodriguez Jr., to the [facility]IC[facility] [place]. Please see a copy of my visit note below.    ASSESSMENT & PLAN    Cristobal was seen today for pain.    Diagnoses and all orders for this visit:    Bilateral primary osteoarthritis of knee    Acute pain of left knee  -     XR Knee Standing AP [place] Bilat Lat Left; Future        34 year old male presents with left greater than right knee pain, on and off for the past 2 years.  He does have some genu valgum on exam, reports this is baseline and slightly increased on the left.  We discussed that he does have mild degenerative changes of his bilateral knees and given his young age it is important for us to manage this to prevent worsening with time and maintain his knee health.      Plan:  -Get regular low impact exercise   -Continue lifting, keep an emphasis on maintaining strength in your quads, hamstrings, calves, core, and hip abductors   -Replace shoes every 3-500 miles   -Can try Tumeric (theracurmin or curcumin) daily  (try 1000mg to start). For any supplement, start by trying for 3 months, only continue if it helps   -Wear supportive shoes with low arch supports at a minimum  -Consider shoe inserts (try Mera shoes and try on and walk around before buying)  -Consider hyaluronic acid injections or corticosteroid injections in the future   -Consider braces for high-impact sports   -Consider PRP in the future       Dr. Joan Holden, DO, CAQ  [facility]  Sports Medicine     -----  Chief Complaint   Patient presents with     Left Knee - Pain       SUBJECTIVE  Cristobal Rodriguez Jr. is a/an 34 year old male who is seen as a self referral for evaluation of left knee. This started 2 month(s) ago, with insidious onset. Pain is located over the medial, radiates posterior when putting  "pressure on the knee. Symptoms are worsened by kneeling, up and down stairs. He has tried  ice . Associated symptoms include:  a lot of \"cracking\" .    The patient is seen alone    Prior injury/Surgical history of affected joint: no  Social History/Occupation: Works in IT.  Right handed.  Loves to play hockey and work out.    REVIEW OF SYSTEMS:  Pertinent positives/negative: As stated above in HPI    OBJECTIVE:  Ht 1.778 m (5' 10\")   Wt 95.3 kg (210 lb)   BMI 30.13 kg/m     General: Alert and in no distress  CV: Extremities appear well perfused   Resp: normal respiratory effort  MSK:  BILATERAL KNEE  Inspection:    Normal alignment; no edema, erythema, or ecchymosis present. Slight genu varum l>R  Palpation:    Tender about the medial joint line. Remainder of bony and ligamentous landmarks are nontender.    No effusion is present    Patellofemoral crepitus is Present  Range of Motion:     00 extension to 1350 flexion  Strength:    Extensor mechanism intact  Special Tests:    Positive: Nellie  on the left    Negative: Valgus stress (0 and 30) and Varus stress (0 and 30)       RADIOLOGY:  Final results and radiologist's interpretation available in the [facility] health record.  Images below were personally reviewed and discussed with the patient in the office today.  My personal interpretation of the performed imaging: Bilateral knee x-rays revealed tibial spurring and mild degenerative changes on the left greater than right, no acute fractures, no other osseous abnormalities                 Disclaimer: This note consists of text and symbols derived from dictation and/or voice recognition software. As a result, there may be errors in the script that have gone undetected. Please consider this when interpreting information found in this chart.        Again, thank you for allowing me to participate in the care of your patient.        Sincerely,        Joan Holden, DO    Electronically signed"

## 2025-03-27 NOTE — PROGRESS NOTES
"ASSESSMENT & PLAN    Cristobal was seen today for pain.    Diagnoses and all orders for this visit:    Bilateral primary osteoarthritis of knee    Acute pain of left knee  -     XR Knee Standing AP [place] Bilat Lat Left; Future        34 year old male presents with left greater than right knee pain, on and off for the past 2 years.  He does have some genu valgum on exam, reports this is baseline and slightly increased on the left.  We discussed that he does have mild degenerative changes of his bilateral knees and given his young age it is important for us to manage this to prevent worsening with time and maintain his knee health.      Plan:  -Get regular low impact exercise   -Continue lifting, keep an emphasis on maintaining strength in your quads, hamstrings, calves, core, and hip abductors   -Replace shoes every 3-500 miles   -Can try Tumeric (theracurmin or curcumin) daily  (try 1000mg to start). For any supplement, start by trying for 3 months, only continue if it helps   -Wear supportive shoes with low arch supports at a minimum  -Consider shoe inserts (try Mera shoes and try on and walk around before buying)  -Consider hyaluronic acid injections or corticosteroid injections in the future   -Consider braces for high-impact sports   -Consider PRP in the future       Dr. Joan Holden, DO, CAQ  [facility]  Sports Medicine     -----  Chief Complaint   Patient presents with    Left Knee - Pain       SUBJECTIVE  Cristobal Rodriguez . is a/an 34 year old male who is seen as a self referral for evaluation of left knee. This started 2 month(s) ago, with insidious onset. Pain is located over the medial, radiates posterior when putting pressure on the knee. Symptoms are worsened by kneeling, up and down stairs. He has tried  ice . Associated symptoms include:  a lot of \"cracking\" .    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    REVIEW OF SYSTEMS:  Pertinent positives/negative: As stated above in HPI    OBJECTIVE:  Ht 1.778 m (5' 10\")   Wt 95.3 kg (210 lb)   BMI 30.13 kg/m     General: Alert and in no distress  CV: Extremities appear well perfused   Resp: normal respiratory effort  MSK:  BILATERAL KNEE  Inspection:    Normal alignment; no edema, erythema, or ecchymosis present. Slight genu varum l>R  Palpation:    Tender about the medial joint line. Remainder of bony and ligamentous landmarks are nontender.    No effusion is present    Patellofemoral crepitus is Present  Range of Motion:     00 extension to 1350 flexion  Strength:    Extensor mechanism intact  Special Tests:    Positive: Nellie  on the left    Negative: Valgus stress (0 and 30) and Varus stress (0 and 30)       RADIOLOGY:  Final results and radiologist's interpretation available in the [facility] health record.  Images below were personally reviewed and discussed with the patient in the office today.  My personal interpretation of the performed imaging: Bilateral knee x-rays revealed tibial spurring and mild degenerative changes on the left greater than right, no acute fractures, no other osseous abnormalities                 Disclaimer: This note consists of text and symbols derived from dictation and/or voice recognition software. As a result, there may be errors in the script that have gone undetected. Please consider this when interpreting information found in this chart.    "

## 2025-03-27 NOTE — PATIENT INSTRUCTIONS
1. Bilateral primary osteoarthritis of knee    2. Acute pain of left knee        Plan:  -Get regular low impact exercise   -Continue lifting, keep an emphasis on maintaining strength in your quads, hamstrings, calves, core, and hip abductors   -Replace shoes every 3-500 miles   -Can try Tumeric (theracurmin or curcumin) daily  (try 1000mg to start). For any supplement, start by trying for 3 months, only continue if it helps   -Wear supportive shoes with low arch supports at a minimum  -Consider shoe inserts (try Mera shoes and try on and walk around before buying)  -Consider hyaluronic acid injections or corticosteroid injections in the future   -Consider braces for high-impact sports   -Consider PRP in the future       If you had imaging performed/ordered at your appointment today, you can expect to see your radiology results in [employer] within approximately 48 business hours.     If you have questions/concerns after your appointment, please send my team a Veri[employer] message or call the clinic at [phone].     Dr. Joan Holden, , [facility]  Sports Medicine and Orthopedics    Dr. Holden's Clinic Locations and Contact Numbers:   [place] APPOINTMENTS: [phone]      [address] [facility] RADIOLOGY: [phone]   [place] [address] PHYSICAL THERAPY: [phone]    HAND THERAPY/OT: [phone]   [place] BILLING QUESTIONS: [phone] [address] FAX: [phone]   [place] [address]         Non-Operative treatment of Knee Osteoarthritis    To Decrease Stress  Try to get regular exercise and stay active, try lower impact activity such as elliptical, bicycle, swimming, or walking  Muscle Strengthening: Consider physical therapy and start home exercise program  Weight Control  Consider using walking poles/cane or a knee brace to offload knee    To Decrease Pain  Tylenol (Acetaminophen) as needed 2 tablets (1,000 mg) three times per day, not to exceed 3,000 mg per day   Trial  "topical Voltaren (Diclofenac) gel up to 4x daily to area of pain  Glucosamine chondroitin or Tumeric (Curcumin) supplements. (May try taking for 3 months and if helpful, continue)  Steroid injections (no sooner than every 3 months)  \"Gel\" (Viscosupplementation) injections (Synvisc, Euflexxa, etc. are brand names)  Platelet Rich Plasma (Not covered by insurance)    Free Online Resources for Knee Osteoarthritis  My Knee exercise: Online program provides education and a 6-month knee strengthening program: [email or web address]  My Joint Yoga: Online 12-week yoga program with videos for knee/hip osteoarthritis: [email or web address]   "

## 2025-04-10 ENCOUNTER — OFFICE VISIT (OUTPATIENT)
Dept: ORTHOPEDICS | Facility: CLINIC | Age: 35
End: 2025-04-10
Payer: COMMERCIAL

## 2025-04-10 DIAGNOSIS — M25.461 BILATERAL KNEE EFFUSIONS: ICD-10-CM

## 2025-04-10 DIAGNOSIS — M25.462 BILATERAL KNEE EFFUSIONS: ICD-10-CM

## 2025-04-10 DIAGNOSIS — M17.0 BILATERAL PRIMARY OSTEOARTHRITIS OF KNEE: Primary | ICD-10-CM

## 2025-04-10 RX ORDER — ROPIVACAINE HYDROCHLORIDE 5 MG/ML
4 INJECTION, SOLUTION EPIDURAL; INFILTRATION; PERINEURAL
Status: COMPLETED | OUTPATIENT
Start: 2025-04-10 | End: 2025-04-10

## 2025-04-10 RX ORDER — LIDOCAINE HYDROCHLORIDE 10 MG/ML
3 INJECTION, SOLUTION INFILTRATION; PERINEURAL
Status: COMPLETED | OUTPATIENT
Start: 2025-04-10 | End: 2025-04-10

## 2025-04-10 RX ORDER — TRIAMCINOLONE ACETONIDE 40 MG/ML
40 INJECTION, SUSPENSION INTRA-ARTICULAR; INTRAMUSCULAR
Status: COMPLETED | OUTPATIENT
Start: 2025-04-10 | End: 2025-04-10

## 2025-04-10 RX ADMIN — TRIAMCINOLONE ACETONIDE 40 MG: 40 INJECTION, SUSPENSION INTRA-ARTICULAR; INTRAMUSCULAR at 15:11

## 2025-04-10 RX ADMIN — ROPIVACAINE HYDROCHLORIDE 4 ML: 5 INJECTION, SOLUTION EPIDURAL; INFILTRATION; PERINEURAL at 15:11

## 2025-04-10 RX ADMIN — LIDOCAINE HYDROCHLORIDE 3 ML: 10 INJECTION, SOLUTION INFILTRATION; PERINEURAL at 15:11

## 2025-04-10 NOTE — PROGRESS NOTES
Sports Medicine Procedure Note    Cristobal was seen today for pain and pain.    Diagnoses and all orders for this visit:    Bilateral primary osteoarthritis of knee    Bilateral knee effusions    Other orders  -     Large Joint Injection/Arthocentesis: bilateral knee        Cristobal Rodriguez Jr. is a/an 34 year old male who is seen for Corticosteroid injection of bilateral knee.  Of note, he does have small effusions of his right greater than left knee visualized on ultrasound today.  He complains of multi-joint pain, and does have a family history of autoimmune disease.  Discussed that I would like him to monitor his response to intra-articular injections, as this will help us isolate his primary source of pain.      Plan:  -Bilateral intra-articular knee injection performed in clinic today  - Continue to follow-up with primary care provider, and discuss referral to rheumatology given multijoint pain and family history of autoimmune disease  - Continue to stay active and get regular physical exercise  - Consider referral to PT specialist for biomechanical evaluation and strengthening in the future if no improvement  - Monitor how much of your knee pain improves after injections, and for how long  - If no improvement, would consider MRI for further evaluation    -Post-injection instructions were provided to the patient  -Follow-up: PRN      Post-Injection Discharge Instructions    You may shower, however avoid swimming, tub baths or hot tubs for 24 hours following your procedure  You may have a mild to moderate increase in pain for a few days following the injection.  The lidocaine (local numbing medicine) will wear off in several hours. It usually takes 3-5 days for the steroid medication to start working although it may take up to 14 days for full effect.   You may use ice packs for 10-15 minutes, 3 to 4 times a day at the injection site for comfort if needed  You may use extra strength Tylenol for pain control if  necessary   If you were fasting, you may resume your normal diet and medications after the procedure  If you have diabetes, your blood sugar may be higher than normal for 10-14 days following a steroid injection. Contact your doctor who manages your diabetes if your blood sugar is significantly higher than usual    If you experience any of the following, call Sports Medicine @  [phone]  -Fever over 100 degrees F  -Swelling, bleeding, redness, drainage, warmth at the injection site  -New or significant worsening pain    Large Joint Injection/Arthocentesis: bilateral knee    Date/Time: 4/10/2025 3:11 PM    Performed by: Joan Holden DO  Authorized by: Joan Holden DO    Indications:  Pain  Needle Size:  25 G  Guidance: ultrasound    Approach:  Anterolateral  Location:  Knee  Laterality:  Bilateral      Medications (Right):  40 mg triamcinolone 40 MG/ML; 3 mL lidocaine 1 %; 4 mL ROPivacaine 5 MG/ML  Medications (Left):  40 mg triamcinolone 40 MG/ML; 3 mL lidocaine 1 %; 4 mL ROPivacaine 5 MG/ML  Aspirate amount (mL):  3  Aspirate:  Yellow  Outcome:  Tolerated well, no immediate complications  Procedure discussed: discussed risks, benefits, and alternatives    Consent Given by:  Patient  Timeout: timeout called immediately prior to procedure    Prep: patient was prepped and draped in usual sterile fashion     Ultrasound was used to ensure safe and accurate needle placement and injection. Ultrasound images of the procedure were permanently stored.  1ml of 8.4% Sodium Bicarbonate solution was used to buffer the local numbing agent for today's injection        Dr. Joan Holden DO  [facility]  Sports Medicine     -----

## 2025-04-10 NOTE — LETTER
4/10/2025      Cristobal Rodriguez Jr.  [address] [place] [id number]      Dear Colleague,    Thank you for referring your patient, Cristobal Rodriguez Jr., to the [facility] SPORTS MEDICINE CLINIC [place]. Please see a copy of my visit note below.    Sports Medicine Procedure Note    Cristobal was seen today for pain and pain.    Diagnoses and all orders for this visit:    Bilateral primary osteoarthritis of knee    Bilateral knee effusions    Other orders  -     Large Joint Injection/Arthocentesis: bilateral knee        Cristobal Rodriguez Jr. is a/an 34 year old male who is seen for Corticosteroid injection of bilateral knee.  Of note, he does have small effusions of his right greater than left knee visualized on ultrasound today.  He complains of multi-joint pain, and does have a family history of autoimmune disease.  Discussed that I would like him to monitor his response to intra-articular injections, as this will help us isolate his primary source of pain.      Plan:  -Bilateral intra-articular knee injection performed in clinic today  - Continue to follow-up with primary care provider, and discuss referral to rheumatology given multijoint pain and family history of autoimmune disease  - Continue to stay active and get regular physical exercise  - Consider referral to PT specialist for biomechanical evaluation and strengthening in the future if no improvement  - Monitor how much of your knee pain improves after injections, and for how long  - If no improvement, would consider MRI for further evaluation    -Post-injection instructions were provided to the patient  -Follow-up: PRN      Post-Injection Discharge Instructions    You may shower, however avoid swimming, tub baths or hot tubs for 24 hours following your procedure  You may have a mild to moderate increase in pain for a few days following the injection.  The lidocaine (local numbing medicine) will wear off in several hours. It usually takes 3-5 days for the  steroid medication to start working although it may take up to 14 days for full effect.   You may use ice packs for 10-15 minutes, 3 to 4 times a day at the injection site for comfort if needed  You may use extra strength Tylenol for pain control if necessary   If you were fasting, you may resume your normal diet and medications after the procedure  If you have diabetes, your blood sugar may be higher than normal for 10-14 days following a steroid injection. Contact your doctor who manages your diabetes if your blood sugar is significantly higher than usual    If you experience any of the following, call Sports Medicine @  [phone]  -Fever over 100 degrees F  -Swelling, bleeding, redness, drainage, warmth at the injection site  -New or significant worsening pain    Large Joint Injection/Arthocentesis: bilateral knee    Date/Time: 4/10/2025 3:11 PM    Performed by: Jaon Holden DO  Authorized by: Joan Holden DO    Indications:  Pain  Needle Size:  25 G  Guidance: ultrasound    Approach:  Anterolateral  Location:  Knee  Laterality:  Bilateral      Medications (Right):  40 mg triamcinolone 40 MG/ML; 3 mL lidocaine 1 %; 4 mL ROPivacaine 5 MG/ML  Medications (Left):  40 mg triamcinolone 40 MG/ML; 3 mL lidocaine 1 %; 4 mL ROPivacaine 5 MG/ML  Aspirate amount (mL):  3  Aspirate:  Yellow  Outcome:  Tolerated well, no immediate complications  Procedure discussed: discussed risks, benefits, and alternatives    Consent Given by:  Patient  Timeout: timeout called immediately prior to procedure    Prep: patient was prepped and draped in usual sterile fashion     Ultrasound was used to ensure safe and accurate needle placement and injection. Ultrasound images of the procedure were permanently stored.  1ml of 8.4% Sodium Bicarbonate solution was used to buffer the local numbing agent for today's injection        Dr. Joan Holden DO  [facility]  Sports Medicine      -----      Again, thank you for allowing me to participate in the care of your patient.        Sincerely,        Joan Holden, DO    Electronically signed

## 2025-04-10 NOTE — PATIENT INSTRUCTIONS
1. Bilateral primary osteoarthritis of knee    2. Bilateral knee effusions        Plan:  -Bilateral intra-articular knee injection performed in clinic today  - Continue to follow-up with primary care provider, and discuss referral to rheumatology given multijoint pain and family history of autoimmune disease  - Continue to stay active and get regular physical exercise  - Consider referral to PT specialist for biomechanical evaluation and strengthening in the future if no improvement  - Monitor how much of your knee pain improves after injections, and for how long  - If no improvement, would consider MRI for further evaluation    If you had imaging performed/ordered at your appointment today, you can expect to see your radiology results in [employer] within approximately 48 business hours.     If you have questions/concerns after your appointment, please send my team a CriticalBlue message or call the clinic at [phone].     Dr. Joan Holden, , [facility]  Sports Medicine and Orthopedics    Dr. Holden's Clinic Locations and Contact Numbers:   [place] APPOINTMENTS: [phone]      [address] [facility] RADIOLOGY: [phone]   [place] [address] PHYSICAL THERAPY: [phone]    HAND THERAPY/OT: [phone]   [place] BILLING QUESTIONS: [phone] [address] FAX: [phone]   [place] [address]       Post-Injection Discharge Instructions    You may shower, however avoid swimming, tub baths or hot tubs for 24 hours following your procedure  You may have a mild to moderate increase in pain for a few days following the injection.  The lidocaine (local numbing medicine) will wear off in several hours. It usually takes 3-5 days for the steroid medication to start working although it may take up to 14 days for full effect.   You may use ice packs for 10-15 minutes, 3 to 4 times a day at the injection site for comfort if needed  You may use extra strength Tylenol for pain control if  necessary   If you were fasting, you may resume your normal diet and medications after the procedure  If you have diabetes, your blood sugar may be higher than normal for 10-14 days following a steroid injection. Contact your doctor who manages your diabetes if your blood sugar is significantly higher than usual    If you experience any of the following, call Sports Medicine @ [phone]  -Fever over 100 degrees F  -Swelling, bleeding, redness, drainage, warmth at the injection site  -New or significant worsening pain

## 2025-09-04 ENCOUNTER — VIRTUAL VISIT (OUTPATIENT)
Dept: FAMILY MEDICINE | Facility: CLINIC | Age: 35
End: 2025-09-04
Payer: COMMERCIAL

## 2025-09-04 DIAGNOSIS — E78.5 DYSLIPIDEMIA: ICD-10-CM

## 2025-09-04 DIAGNOSIS — G89.29 CHRONIC PAIN OF LEFT KNEE: Primary | ICD-10-CM

## 2025-09-04 DIAGNOSIS — M25.562 CHRONIC PAIN OF LEFT KNEE: Primary | ICD-10-CM

## 2025-09-04 RX ORDER — SIMVASTATIN 10 MG
10 TABLET ORAL AT BEDTIME
Qty: 90 TABLET | Refills: 0 | Status: SHIPPED | OUTPATIENT
Start: 2025-09-04

## 2025-09-04 RX ORDER — DULOXETINE 40 MG/1
1 CAPSULE, DELAYED RELEASE ORAL DAILY
COMMUNITY

## 2025-09-04 RX ORDER — SULFASALAZINE 500 MG/1
2 TABLET ORAL
COMMUNITY
Start: 2025-07-29

## 2025-09-04 ASSESSMENT — ASTHMA QUESTIONNAIRES
QUESTION_5 LAST FOUR WEEKS HOW WOULD YOU RATE YOUR ASTHMA CONTROL: COMPLETELY CONTROLLED
QUESTION_3 LAST FOUR WEEKS HOW OFTEN DID YOUR ASTHMA SYMPTOMS (WHEEZING, COUGHING, SHORTNESS OF BREATH, CHEST TIGHTNESS OR PAIN) WAKE YOU UP AT NIGHT OR EARLIER THAN USUAL IN THE MORNING: NOT AT ALL
QUESTION_1 LAST FOUR WEEKS HOW MUCH OF THE TIME DID YOUR ASTHMA KEEP YOU FROM GETTING AS MUCH DONE AT WORK, SCHOOL OR AT HOME: SOME OF THE TIME
ACT_TOTALSCORE: 25
QUESTION_4 LAST FOUR WEEKS HOW OFTEN HAVE YOU USED YOUR RESCUE INHALER OR NEBULIZER MEDICATION (SUCH AS ALBUTEROL): NOT AT ALL
QUESTION_1 LAST FOUR WEEKS HOW MUCH OF THE TIME DID YOUR ASTHMA KEEP YOU FROM GETTING AS MUCH DONE AT WORK, SCHOOL OR AT HOME: NONE OF THE TIME
ACT_TOTALSCORE: 23
QUESTION_3 LAST FOUR WEEKS HOW OFTEN DID YOUR ASTHMA SYMPTOMS (WHEEZING, COUGHING, SHORTNESS OF BREATH, CHEST TIGHTNESS OR PAIN) WAKE YOU UP AT NIGHT OR EARLIER THAN USUAL IN THE MORNING: NOT AT ALL
QUESTION_2 LAST FOUR WEEKS HOW OFTEN HAVE YOU HAD SHORTNESS OF BREATH: NOT AT ALL
QUESTION_5 LAST FOUR WEEKS HOW WOULD YOU RATE YOUR ASTHMA CONTROL: COMPLETELY CONTROLLED
QUESTION_4 LAST FOUR WEEKS HOW OFTEN HAVE YOU USED YOUR RESCUE INHALER OR NEBULIZER MEDICATION (SUCH AS ALBUTEROL): NOT AT ALL
QUESTION_2 LAST FOUR WEEKS HOW OFTEN HAVE YOU HAD SHORTNESS OF BREATH: NOT AT ALL